# Patient Record
Sex: MALE | Race: WHITE | Employment: FULL TIME | URBAN - METROPOLITAN AREA
[De-identification: names, ages, dates, MRNs, and addresses within clinical notes are randomized per-mention and may not be internally consistent; named-entity substitution may affect disease eponyms.]

---

## 2017-03-18 ENCOUNTER — OFFICE VISIT (OUTPATIENT)
Dept: FAMILY MEDICINE CLINIC | Facility: CLINIC | Age: 52
End: 2017-03-18

## 2017-03-18 VITALS
TEMPERATURE: 99 F | DIASTOLIC BLOOD PRESSURE: 82 MMHG | RESPIRATION RATE: 20 BRPM | SYSTOLIC BLOOD PRESSURE: 110 MMHG | OXYGEN SATURATION: 95 % | HEART RATE: 101 BPM

## 2017-03-18 DIAGNOSIS — J01.00 ACUTE NON-RECURRENT MAXILLARY SINUSITIS: Primary | ICD-10-CM

## 2017-03-18 PROCEDURE — 99213 OFFICE O/P EST LOW 20 MIN: CPT | Performed by: NURSE PRACTITIONER

## 2017-03-18 RX ORDER — AMOXICILLIN AND CLAVULANATE POTASSIUM 875; 125 MG/1; MG/1
1 TABLET, FILM COATED ORAL 2 TIMES DAILY
Qty: 20 TABLET | Refills: 0 | Status: SHIPPED | OUTPATIENT
Start: 2017-03-18 | End: 2017-03-28

## 2017-03-18 RX ORDER — CODEINE PHOSPHATE AND GUAIFENESIN 10; 100 MG/5ML; MG/5ML
10 SOLUTION ORAL NIGHTLY PRN
Qty: 120 ML | Refills: 0 | Status: SHIPPED | OUTPATIENT
Start: 2017-03-18 | End: 2018-05-21

## 2017-03-18 RX ORDER — BENZONATATE 200 MG/1
200 CAPSULE ORAL 3 TIMES DAILY PRN
Qty: 30 CAPSULE | Refills: 0 | Status: SHIPPED | OUTPATIENT
Start: 2017-03-18 | End: 2018-05-21

## 2017-03-18 NOTE — PATIENT INSTRUCTIONS
Use the rescue inhaler at home  OK to use the OTC cold and cough medications being used    Sinusitis (Antibiotic Treatment)    The sinuses are air-filled spaces within the bones of the face.  They connect to the inside of the nose. Sinusitis is an inflammat · Do not use nasal rinses or irrigation during an acute sinus infection, unless told to by your health care provider. Rinsing may spread the infection to other sinuses.   · Use acetaminophen or ibuprofen to control pain, unless another pain medicine was pre Healthy airway: Airways are normally open. Air moves in and out easily. Healthy cilia: Tiny, hairlike cilia sweep mucus and particles up and out of the airways. Lungs with bronchitis  Bronchitis often occurs with a cold or the flu virus.  The airways · Drink a lot of water and juice. They can soothe the throat and may help thin mucus. · Sit up or use extra pillows when in bed to help lessen coughing and congestion.   · Ask your provider about using cough medicine, pain and fever medicine, or a deconges © 0910-9606 [address], [address] [place]. All rights reserved. This information is not intended as a substitute for professional medical care. Always follow your healthcare professional's instructions.

## 2017-03-18 NOTE — PROGRESS NOTES
CHIEF COMPLAINT:   Patient presents with:  Cough        HPI:   Andrés Can is a 50year old male who presents for cough for  2  weeks. Patient has no history of bronchitis. Cough is both dry and productive and is worse at night.  Other triggers for the c HENT: Atraumatic, normocephalic. TM's clear bilaterally. Nostrils patent, nasal mucosa red and inflamed. Yellow discharge noted to left side. No erythema of the throat. NECK: supple, non-tender. LUNGS: Normal respiratory rate. Normal effort.   dry coug The sinuses are air-filled spaces within the bones of the face. They connect to the inside of the nose. Sinusitis is an inflammation of the tissue lining the sinus cavity. Sinus inflammation can occur during a cold.  It can also be due to allergies to polle · Do not use nasal rinses or irrigation during an acute sinus infection, unless told to by your health care provider. Rinsing may spread the infection to other sinuses.   · Use acetaminophen or ibuprofen to control pain, unless another pain medicine was pre Healthy airway: Airways are normally open. Air moves in and out easily. Healthy cilia: Tiny, hairlike cilia sweep mucus and particles up and out of the airways. Lungs with bronchitis  Bronchitis often occurs with a cold or the flu virus.  The airways · Drink a lot of water and juice. They can soothe the throat and may help thin mucus. · Sit up or use extra pillows when in bed to help lessen coughing and congestion.   · Ask your provider about using cough medicine, pain and fever medicine, or a deconges © 2267-1226 [address], [address] [place]. All rights reserved. This information is not intended as a substitute for professional medical care. Always follow your healthcare professional's instructions.               Meche Wright

## 2018-05-22 PROBLEM — M25.562 LEFT KNEE PAIN, UNSPECIFIED CHRONICITY: Status: ACTIVE | Noted: 2018-05-22

## 2018-05-22 PROBLEM — S86.912A KNEE STRAIN, LEFT, INITIAL ENCOUNTER: Status: ACTIVE | Noted: 2018-05-22

## 2020-01-16 ENCOUNTER — OFFICE VISIT (OUTPATIENT)
Dept: SURGERY | Facility: CLINIC | Age: 55
End: 2020-01-16
Payer: COMMERCIAL

## 2020-01-16 VITALS
BODY MASS INDEX: 28.04 KG/M2 | TEMPERATURE: 98 F | SYSTOLIC BLOOD PRESSURE: 119 MMHG | HEIGHT: 72 IN | HEART RATE: 76 BPM | WEIGHT: 207 LBS | DIASTOLIC BLOOD PRESSURE: 84 MMHG

## 2020-01-16 DIAGNOSIS — K62.5 RECTAL BLEEDING: Primary | ICD-10-CM

## 2020-01-16 PROCEDURE — 46600 DIAGNOSTIC ANOSCOPY SPX: CPT | Performed by: SURGERY

## 2020-01-16 PROCEDURE — 99243 OFF/OP CNSLTJ NEW/EST LOW 30: CPT | Performed by: SURGERY

## 2020-01-22 RX ORDER — POLYETHYLENE GLYCOL 3350, SODIUM CHLORIDE, SODIUM BICARBONATE, POTASSIUM CHLORIDE 420; 11.2; 5.72; 1.48 G/4L; G/4L; G/4L; G/4L
POWDER, FOR SOLUTION ORAL
Qty: 1 BOTTLE | Refills: 0 | Status: SHIPPED | OUTPATIENT
Start: 2020-01-22

## 2020-01-22 NOTE — H&P
New Patient Visit Note       Active Problems      1. Rectal bleeding        Chief Complaint   Patient presents with:  Anal / Rectal Problem: NP c/o rectal bleeding and pain, wife is former pt of Dr. Yolanda Boyle. Pt states the bleeding is intermittent.       All arm)   Pulse 76   Temp 97.6 °F (36.4 °C) (Oral)   Ht 72\"   Wt 207 lb (93.9 kg)   BMI 28.07 kg/m²   Physical Exam   Constitutional: He is oriented to person, place, and time. He appears well-developed and well-nourished.    HENT:   Head: Normocephalic and a with screening colonoscopy for single episode of rectal bleeding  No indication for surgical intervention for small internal hemorrhoids noted on examination         The risks, benefits, complications, possible outcomes and alternatives of the procedure we

## 2020-01-22 NOTE — PROCEDURES
Date of procedure:   January 16, 2020    Preop Diagnosis:  Hemorrhoidal disease    Postop diagnosis:   Same    Procedure:  Anoscopy    Surgeon:   Carson Alba    Anesthesia:   None    Findings:   Multiple small internal hemorrhoids noted-no evidence of external

## 2020-02-29 ENCOUNTER — LAB ENCOUNTER (OUTPATIENT)
Dept: LAB | Age: 55
End: 2020-02-29
Attending: NURSE PRACTITIONER
Payer: COMMERCIAL

## 2020-02-29 ENCOUNTER — OFFICE VISIT (OUTPATIENT)
Dept: INTERNAL MEDICINE CLINIC | Facility: CLINIC | Age: 55
End: 2020-02-29
Payer: COMMERCIAL

## 2020-02-29 VITALS
WEIGHT: 210 LBS | TEMPERATURE: 98 F | BODY MASS INDEX: 27.83 KG/M2 | DIASTOLIC BLOOD PRESSURE: 76 MMHG | RESPIRATION RATE: 16 BRPM | HEIGHT: 73 IN | HEART RATE: 68 BPM | OXYGEN SATURATION: 96 % | SYSTOLIC BLOOD PRESSURE: 124 MMHG

## 2020-02-29 DIAGNOSIS — Z12.5 SCREENING FOR MALIGNANT NEOPLASM OF PROSTATE: ICD-10-CM

## 2020-02-29 DIAGNOSIS — Z13.220 SCREENING, LIPID: ICD-10-CM

## 2020-02-29 DIAGNOSIS — Z00.00 ROUTINE GENERAL MEDICAL EXAMINATION AT A HEALTH CARE FACILITY: Primary | ICD-10-CM

## 2020-02-29 DIAGNOSIS — Z13.0 SCREENING FOR BLOOD DISEASE: ICD-10-CM

## 2020-02-29 DIAGNOSIS — Z13.29 SCREENING FOR THYROID DISORDER: ICD-10-CM

## 2020-02-29 DIAGNOSIS — G47.10 HYPERSOMNOLENCE: ICD-10-CM

## 2020-02-29 DIAGNOSIS — R06.81 APNEA: ICD-10-CM

## 2020-02-29 DIAGNOSIS — Z13.1 SCREENING FOR DIABETES MELLITUS: ICD-10-CM

## 2020-02-29 DIAGNOSIS — Z91.09 ENVIRONMENTAL ALLERGIES: ICD-10-CM

## 2020-02-29 DIAGNOSIS — R06.83 SNORING: ICD-10-CM

## 2020-02-29 PROBLEM — S86.912A KNEE STRAIN, LEFT, INITIAL ENCOUNTER: Status: RESOLVED | Noted: 2018-05-22 | Resolved: 2020-02-29

## 2020-02-29 PROBLEM — M25.562 LEFT KNEE PAIN, UNSPECIFIED CHRONICITY: Status: RESOLVED | Noted: 2018-05-22 | Resolved: 2020-02-29

## 2020-02-29 LAB
ALBUMIN SERPL-MCNC: 3.8 G/DL (ref 3.4–5)
ALBUMIN/GLOB SERPL: 1 {RATIO} (ref 1–2)
ALP LIVER SERPL-CCNC: 84 U/L (ref 45–117)
ALT SERPL-CCNC: 24 U/L (ref 16–61)
ANION GAP SERPL CALC-SCNC: 4 MMOL/L (ref 0–18)
AST SERPL-CCNC: 15 U/L (ref 15–37)
BASOPHILS # BLD AUTO: 0.03 X10(3) UL (ref 0–0.2)
BASOPHILS NFR BLD AUTO: 0.5 %
BILIRUB SERPL-MCNC: 0.5 MG/DL (ref 0.1–2)
BUN BLD-MCNC: 19 MG/DL (ref 7–18)
BUN/CREAT SERPL: 15.8 (ref 10–20)
CALCIUM BLD-MCNC: 9 MG/DL (ref 8.5–10.1)
CHLORIDE SERPL-SCNC: 106 MMOL/L (ref 98–112)
CHOLEST SMN-MCNC: 190 MG/DL (ref ?–200)
CO2 SERPL-SCNC: 29 MMOL/L (ref 21–32)
COMPLEXED PSA SERPL-MCNC: 1.5 NG/ML (ref ?–4)
CREAT BLD-MCNC: 1.2 MG/DL (ref 0.7–1.3)
DEPRECATED RDW RBC AUTO: 42.9 FL (ref 35.1–46.3)
EOSINOPHIL # BLD AUTO: 0.07 X10(3) UL (ref 0–0.7)
EOSINOPHIL NFR BLD AUTO: 1.2 %
ERYTHROCYTE [DISTWIDTH] IN BLOOD BY AUTOMATED COUNT: 12.6 % (ref 11–15)
GLOBULIN PLAS-MCNC: 3.8 G/DL (ref 2.8–4.4)
GLUCOSE BLD-MCNC: 93 MG/DL (ref 70–99)
HCT VFR BLD AUTO: 51.4 % (ref 39–53)
HDLC SERPL-MCNC: 47 MG/DL (ref 40–59)
HGB BLD-MCNC: 16.8 G/DL (ref 13–17.5)
IMM GRANULOCYTES # BLD AUTO: 0.01 X10(3) UL (ref 0–1)
IMM GRANULOCYTES NFR BLD: 0.2 %
LDLC SERPL CALC-MCNC: 132 MG/DL (ref ?–100)
LYMPHOCYTES # BLD AUTO: 1.88 X10(3) UL (ref 1–4)
LYMPHOCYTES NFR BLD AUTO: 31.5 %
M PROTEIN MFR SERPL ELPH: 7.6 G/DL (ref 6.4–8.2)
MCH RBC QN AUTO: 30.3 PG (ref 26–34)
MCHC RBC AUTO-ENTMCNC: 32.7 G/DL (ref 31–37)
MCV RBC AUTO: 92.6 FL (ref 80–100)
MONOCYTES # BLD AUTO: 0.61 X10(3) UL (ref 0.1–1)
MONOCYTES NFR BLD AUTO: 10.2 %
NEUTROPHILS # BLD AUTO: 3.36 X10 (3) UL (ref 1.5–7.7)
NEUTROPHILS # BLD AUTO: 3.36 X10(3) UL (ref 1.5–7.7)
NEUTROPHILS NFR BLD AUTO: 56.4 %
NONHDLC SERPL-MCNC: 143 MG/DL (ref ?–130)
OSMOLALITY SERPL CALC.SUM OF ELEC: 290 MOSM/KG (ref 275–295)
PATIENT FASTING Y/N/NP: YES
PATIENT FASTING Y/N/NP: YES
PLATELET # BLD AUTO: 302 10(3)UL (ref 150–450)
POTASSIUM SERPL-SCNC: 4.3 MMOL/L (ref 3.5–5.1)
RBC # BLD AUTO: 5.55 X10(6)UL (ref 4.3–5.7)
SODIUM SERPL-SCNC: 139 MMOL/L (ref 136–145)
TRIGL SERPL-MCNC: 57 MG/DL (ref 30–149)
TSI SER-ACNC: 3.13 MIU/ML (ref 0.36–3.74)
VLDLC SERPL CALC-MCNC: 11 MG/DL (ref 0–30)
WBC # BLD AUTO: 6 X10(3) UL (ref 4–11)

## 2020-02-29 PROCEDURE — 80061 LIPID PANEL: CPT | Performed by: NURSE PRACTITIONER

## 2020-02-29 PROCEDURE — 99213 OFFICE O/P EST LOW 20 MIN: CPT | Performed by: NURSE PRACTITIONER

## 2020-02-29 PROCEDURE — 84153 ASSAY OF PSA TOTAL: CPT | Performed by: NURSE PRACTITIONER

## 2020-02-29 PROCEDURE — 99396 PREV VISIT EST AGE 40-64: CPT | Performed by: NURSE PRACTITIONER

## 2020-02-29 PROCEDURE — 80050 GENERAL HEALTH PANEL: CPT | Performed by: NURSE PRACTITIONER

## 2020-02-29 NOTE — PROGRESS NOTES
Susan Becker is a 50year old male who presents for a complete physical exam.     HPI:   Pt complains of . Multiple allergies. Beer wine and random allergies. Recently with increased congestion and SOB. Unsure trigger.    He is concerned his allerg 2    Drug use: No     Social History:   Exercise:  twice per week. Diet: watches fats closely and watches sugar closely  Smoker:  No    Cessation Advised:    Alcohol Use:  yes      Concerns:  no     Health Maintenance  Immunizations: discussed shingrix. [facility].     Routine general medical examination at a health care facility  (primary encounter diagnosis)  Screening, lipid  Screening for diabetes mellitus  Screening for blood disease  Screening for thyroid disorder  Screening for malignant neoplasm of pro

## 2022-05-17 PROCEDURE — 99285 EMERGENCY DEPT VISIT HI MDM: CPT

## 2022-05-17 PROCEDURE — 96374 THER/PROPH/DIAG INJ IV PUSH: CPT

## 2022-05-17 PROCEDURE — 93010 ELECTROCARDIOGRAM REPORT: CPT

## 2022-05-18 ENCOUNTER — APPOINTMENT (OUTPATIENT)
Dept: CT IMAGING | Facility: HOSPITAL | Age: 57
End: 2022-05-18
Attending: EMERGENCY MEDICINE
Payer: COMMERCIAL

## 2022-05-18 ENCOUNTER — APPOINTMENT (OUTPATIENT)
Dept: MRI IMAGING | Facility: HOSPITAL | Age: 57
End: 2022-05-18
Attending: EMERGENCY MEDICINE
Payer: COMMERCIAL

## 2022-05-18 ENCOUNTER — HOSPITAL ENCOUNTER (EMERGENCY)
Facility: HOSPITAL | Age: 57
Discharge: HOME | End: 2022-05-18
Attending: EMERGENCY MEDICINE
Payer: COMMERCIAL

## 2022-05-18 VITALS
RESPIRATION RATE: 14 BRPM | BODY MASS INDEX: 28.49 KG/M2 | DIASTOLIC BLOOD PRESSURE: 95 MMHG | TEMPERATURE: 98 F | OXYGEN SATURATION: 97 % | HEIGHT: 73 IN | SYSTOLIC BLOOD PRESSURE: 135 MMHG | HEART RATE: 75 BPM | WEIGHT: 215 LBS

## 2022-05-18 DIAGNOSIS — R20.2 NUMBNESS AND TINGLING OF LEFT THUMB: Primary | ICD-10-CM

## 2022-05-18 DIAGNOSIS — R20.0 NUMBNESS AND TINGLING OF LEFT THUMB: Primary | ICD-10-CM

## 2022-05-18 DIAGNOSIS — R20.2 FACIAL TINGLING: ICD-10-CM

## 2022-05-18 LAB
ALBUMIN SERPL-MCNC: 3.6 G/DL (ref 3.4–5)
ALBUMIN/GLOB SERPL: 1 {RATIO} (ref 1–2)
ALP LIVER SERPL-CCNC: 78 U/L
ALT SERPL-CCNC: 21 U/L
ANION GAP SERPL CALC-SCNC: 6 MMOL/L (ref 0–18)
APTT PPP: 26.7 SECONDS (ref 23.3–35.6)
AST SERPL-CCNC: 13 U/L (ref 15–37)
ATRIAL RATE: 56 BPM
BASOPHILS # BLD AUTO: 0.03 X10(3) UL (ref 0–0.2)
BASOPHILS NFR BLD AUTO: 0.4 %
BILIRUB SERPL-MCNC: 0.6 MG/DL (ref 0.1–2)
BUN BLD-MCNC: 18 MG/DL (ref 7–18)
CALCIUM BLD-MCNC: 8.5 MG/DL (ref 8.5–10.1)
CHLORIDE SERPL-SCNC: 108 MMOL/L (ref 98–112)
CO2 SERPL-SCNC: 28 MMOL/L (ref 21–32)
CREAT BLD-MCNC: 1.28 MG/DL
EOSINOPHIL # BLD AUTO: 0.12 X10(3) UL (ref 0–0.7)
EOSINOPHIL NFR BLD AUTO: 1.8 %
ERYTHROCYTE [DISTWIDTH] IN BLOOD BY AUTOMATED COUNT: 12.4 %
GLOBULIN PLAS-MCNC: 3.5 G/DL (ref 2.8–4.4)
GLUCOSE BLD-MCNC: 104 MG/DL (ref 70–99)
HCT VFR BLD AUTO: 45.4 %
HGB BLD-MCNC: 15.1 G/DL
IMM GRANULOCYTES # BLD AUTO: 0.01 X10(3) UL (ref 0–1)
IMM GRANULOCYTES NFR BLD: 0.1 %
INR BLD: 0.96 (ref 0.8–1.2)
LYMPHOCYTES # BLD AUTO: 2.39 X10(3) UL (ref 1–4)
LYMPHOCYTES NFR BLD AUTO: 35.8 %
MCH RBC QN AUTO: 30.8 PG (ref 26–34)
MCHC RBC AUTO-ENTMCNC: 33.3 G/DL (ref 31–37)
MCV RBC AUTO: 92.7 FL
MONOCYTES # BLD AUTO: 0.7 X10(3) UL (ref 0.1–1)
MONOCYTES NFR BLD AUTO: 10.5 %
NEUTROPHILS # BLD AUTO: 3.43 X10 (3) UL (ref 1.5–7.7)
NEUTROPHILS # BLD AUTO: 3.43 X10(3) UL (ref 1.5–7.7)
NEUTROPHILS NFR BLD AUTO: 51.4 %
OSMOLALITY SERPL CALC.SUM OF ELEC: 296 MOSM/KG (ref 275–295)
P AXIS: 20 DEGREES
P-R INTERVAL: 178 MS
PLATELET # BLD AUTO: 294 10(3)UL (ref 150–450)
POTASSIUM SERPL-SCNC: 4 MMOL/L (ref 3.5–5.1)
PROT SERPL-MCNC: 7.1 G/DL (ref 6.4–8.2)
PROTHROMBIN TIME: 12.8 SECONDS (ref 11.6–14.8)
Q-T INTERVAL: 432 MS
QRS DURATION: 108 MS
QTC CALCULATION (BEZET): 416 MS
R AXIS: 13 DEGREES
RBC # BLD AUTO: 4.9 X10(6)UL
SARS-COV-2 RNA RESP QL NAA+PROBE: NOT DETECTED
SODIUM SERPL-SCNC: 142 MMOL/L (ref 136–145)
T AXIS: 30 DEGREES
TROPONIN I HIGH SENSITIVITY: 4 NG/L
VENTRICULAR RATE: 56 BPM
WBC # BLD AUTO: 6.7 X10(3) UL (ref 4–11)

## 2022-05-18 PROCEDURE — A9575 INJ GADOTERATE MEGLUMI 0.1ML: HCPCS | Performed by: EMERGENCY MEDICINE

## 2022-05-18 PROCEDURE — 84484 ASSAY OF TROPONIN QUANT: CPT | Performed by: EMERGENCY MEDICINE

## 2022-05-18 PROCEDURE — 80053 COMPREHEN METABOLIC PANEL: CPT | Performed by: EMERGENCY MEDICINE

## 2022-05-18 PROCEDURE — 85025 COMPLETE CBC W/AUTO DIFF WBC: CPT | Performed by: EMERGENCY MEDICINE

## 2022-05-18 PROCEDURE — 93005 ELECTROCARDIOGRAM TRACING: CPT

## 2022-05-18 PROCEDURE — 85730 THROMBOPLASTIN TIME PARTIAL: CPT | Performed by: EMERGENCY MEDICINE

## 2022-05-18 PROCEDURE — 70549 MR ANGIOGRAPH NECK W/O&W/DYE: CPT | Performed by: EMERGENCY MEDICINE

## 2022-05-18 PROCEDURE — 70450 CT HEAD/BRAIN W/O DYE: CPT | Performed by: EMERGENCY MEDICINE

## 2022-05-18 PROCEDURE — 70546 MR ANGIOGRAPH HEAD W/O&W/DYE: CPT | Performed by: EMERGENCY MEDICINE

## 2022-05-18 PROCEDURE — 70553 MRI BRAIN STEM W/O & W/DYE: CPT | Performed by: EMERGENCY MEDICINE

## 2022-05-18 PROCEDURE — 85610 PROTHROMBIN TIME: CPT | Performed by: EMERGENCY MEDICINE

## 2022-05-18 RX ORDER — LORAZEPAM 2 MG/ML
0.5 INJECTION INTRAMUSCULAR ONCE
Status: COMPLETED | OUTPATIENT
Start: 2022-05-18 | End: 2022-05-18

## 2022-05-18 NOTE — ED INITIAL ASSESSMENT (HPI)
Pt c/o L thumb, \"like it's going to sleep since yesterday, like when I squeeze it, weird sensation goes up to my L elbow. \" Also reports L side of face tingling,, \"behind L eye hurts. \" Reports HA, no n/v. Visual acuity L eye, 20/20, R eye-20/25, pt tolerated well. Pt wears eyeglasses.  He denies CP

## 2022-05-19 ENCOUNTER — PATIENT OUTREACH (OUTPATIENT)
Dept: CASE MANAGEMENT | Age: 57
End: 2022-05-19

## 2022-05-19 NOTE — PROGRESS NOTES
1st attempt ED PCP f/u apt request (dc 05/18)    Dr Kristofer Calvert  [address] [place] [address]  [phone]  LVM to call [phone] to assist w/scheduling apt; will try again

## 2022-06-20 ENCOUNTER — LAB ENCOUNTER (OUTPATIENT)
Dept: LAB | Facility: HOSPITAL | Age: 57
End: 2022-06-20
Attending: NURSE PRACTITIONER
Payer: COMMERCIAL

## 2022-06-20 ENCOUNTER — HOSPITAL ENCOUNTER (OUTPATIENT)
Dept: GENERAL RADIOLOGY | Facility: HOSPITAL | Age: 57
Discharge: HOME | End: 2022-06-20
Attending: NURSE PRACTITIONER
Payer: COMMERCIAL

## 2022-06-20 ENCOUNTER — OFFICE VISIT (OUTPATIENT)
Dept: INTERNAL MEDICINE CLINIC | Facility: CLINIC | Age: 57
End: 2022-06-20
Payer: COMMERCIAL

## 2022-06-20 VITALS
TEMPERATURE: 99 F | DIASTOLIC BLOOD PRESSURE: 70 MMHG | HEART RATE: 82 BPM | SYSTOLIC BLOOD PRESSURE: 116 MMHG | OXYGEN SATURATION: 97 % | WEIGHT: 208 LBS | BODY MASS INDEX: 27.57 KG/M2 | HEIGHT: 73 IN

## 2022-06-20 DIAGNOSIS — R20.2 PARESTHESIAS: Primary | ICD-10-CM

## 2022-06-20 DIAGNOSIS — Z12.11 SCREENING FOR MALIGNANT NEOPLASM OF COLON: ICD-10-CM

## 2022-06-20 DIAGNOSIS — R06.02 SOB (SHORTNESS OF BREATH): ICD-10-CM

## 2022-06-20 DIAGNOSIS — U07.1 COVID: ICD-10-CM

## 2022-06-20 LAB — D DIMER PPP FEU-MCNC: <0.27 UG/ML FEU (ref ?–0.55)

## 2022-06-20 PROCEDURE — 3008F BODY MASS INDEX DOCD: CPT | Performed by: NURSE PRACTITIONER

## 2022-06-20 PROCEDURE — 3078F DIAST BP <80 MM HG: CPT | Performed by: NURSE PRACTITIONER

## 2022-06-20 PROCEDURE — 85379 FIBRIN DEGRADATION QUANT: CPT

## 2022-06-20 PROCEDURE — 3074F SYST BP LT 130 MM HG: CPT | Performed by: NURSE PRACTITIONER

## 2022-06-20 PROCEDURE — 99214 OFFICE O/P EST MOD 30 MIN: CPT | Performed by: NURSE PRACTITIONER

## 2022-06-20 PROCEDURE — 36415 COLL VENOUS BLD VENIPUNCTURE: CPT

## 2022-06-20 PROCEDURE — 71046 X-RAY EXAM CHEST 2 VIEWS: CPT | Performed by: NURSE PRACTITIONER

## 2022-06-20 RX ORDER — ALBUTEROL SULFATE 90 UG/1
2 AEROSOL, METERED RESPIRATORY (INHALATION) EVERY 6 HOURS PRN
Qty: 1 EACH | Refills: 0 | Status: SHIPPED | OUTPATIENT
Start: 2022-06-20

## 2023-07-06 PROBLEM — Z12.11 SPECIAL SCREENING FOR MALIGNANT NEOPLASM OF COLON: Status: ACTIVE | Noted: 2023-07-06

## 2024-06-02 ENCOUNTER — HOSPITAL ENCOUNTER (EMERGENCY)
Age: 59
Discharge: HOME | End: 2024-06-02
Payer: COMMERCIAL

## 2024-06-02 ENCOUNTER — APPOINTMENT (OUTPATIENT)
Dept: GENERAL RADIOLOGY | Age: 59
End: 2024-06-02
Attending: NURSE PRACTITIONER
Payer: COMMERCIAL

## 2024-06-02 VITALS
OXYGEN SATURATION: 97 % | DIASTOLIC BLOOD PRESSURE: 92 MMHG | WEIGHT: 215 LBS | TEMPERATURE: 99 F | HEART RATE: 68 BPM | SYSTOLIC BLOOD PRESSURE: 147 MMHG | RESPIRATION RATE: 16 BRPM | HEIGHT: 72 IN | BODY MASS INDEX: 29.12 KG/M2

## 2024-06-02 DIAGNOSIS — R05.1 ACUTE COUGH: ICD-10-CM

## 2024-06-02 DIAGNOSIS — S39.012A STRAIN OF LUMBAR REGION, INITIAL ENCOUNTER: Primary | ICD-10-CM

## 2024-06-02 LAB
BILIRUB UR QL STRIP.AUTO: NEGATIVE
CLARITY UR REFRACT.AUTO: CLEAR
COLOR UR AUTO: YELLOW
GLUCOSE UR STRIP.AUTO-MCNC: NEGATIVE MG/DL
KETONES UR STRIP.AUTO-MCNC: NEGATIVE MG/DL
LEUKOCYTE ESTERASE UR QL STRIP.AUTO: NEGATIVE
NITRITE UR QL STRIP.AUTO: NEGATIVE
PH UR STRIP.AUTO: 5 [PH] (ref 5–8)
PROT UR STRIP.AUTO-MCNC: NEGATIVE MG/DL
RBC UR QL AUTO: NEGATIVE
SP GR UR STRIP.AUTO: 1.02 (ref 1–1.03)
UROBILINOGEN UR STRIP.AUTO-MCNC: 0.2 MG/DL

## 2024-06-02 PROCEDURE — 96372 THER/PROPH/DIAG INJ SC/IM: CPT

## 2024-06-02 PROCEDURE — 99284 EMERGENCY DEPT VISIT MOD MDM: CPT

## 2024-06-02 PROCEDURE — 72100 X-RAY EXAM L-S SPINE 2/3 VWS: CPT | Performed by: NURSE PRACTITIONER

## 2024-06-02 PROCEDURE — 99283 EMERGENCY DEPT VISIT LOW MDM: CPT

## 2024-06-02 PROCEDURE — 81003 URINALYSIS AUTO W/O SCOPE: CPT | Performed by: NURSE PRACTITIONER

## 2024-06-02 RX ORDER — CYCLOBENZAPRINE HCL 10 MG
10 TABLET ORAL 3 TIMES DAILY PRN
Qty: 20 TABLET | Refills: 0 | Status: SHIPPED | OUTPATIENT
Start: 2024-06-02 | End: 2024-06-09

## 2024-06-02 RX ORDER — KETOROLAC TROMETHAMINE 15 MG/ML
15 INJECTION, SOLUTION INTRAMUSCULAR; INTRAVENOUS ONCE
Status: COMPLETED | OUTPATIENT
Start: 2024-06-02 | End: 2024-06-02

## 2024-06-02 RX ORDER — METHYLPREDNISOLONE 4 MG/1
TABLET ORAL
Qty: 1 EACH | Refills: 0 | Status: SHIPPED | OUTPATIENT
Start: 2024-06-02

## 2024-06-02 NOTE — DISCHARGE INSTRUCTIONS
Increase water intake 2-3 liters daily   Do not drive while taking muscle relaxer as it may cause drowsiness.

## 2024-06-02 NOTE — ED PROVIDER NOTES
Patient Seen in: [facility] [place]      History     Chief Complaint   Patient presents with    Back Pain     Stated Complaint: back pain x 1 week    Subjective:   HPI    59-year-old male presents today with complaints of lower back pain that started last week after he was moving furniture in the garage.  Patient states he has been applying ice and heat to it with little relief.  Patient states he has been taking Advil with little relief.  Patient states he has developed a cough which is making the back pain worse.  Patient denies any loss of bowel or bladder function.    Objective:   Past Medical History:    Change in hair    Chest pain on exertion    Dizziness    Esophageal reflux    Fatigue    Heartburn    Hemorrhoids    Indigestion    Nausea    Shortness of breath    Sleep apnea    Sleep disturbance    Wears glasses    Weight gain    Wheezing              Past Surgical History:   Procedure Laterality Date    Colonoscopy      Vasectomy                  Social History     Socioeconomic History    Marital status:    Tobacco Use    Smoking status: Never    Smokeless tobacco: Never   Vaping Use    Vaping status: Never Used   Substance and Sexual Activity    Alcohol use: Not Currently    Drug use: No              Review of Systems   Constitutional: Negative.    HENT: Negative.     Eyes: Negative.    Respiratory:  Positive for cough.    Cardiovascular: Negative.    Gastrointestinal: Negative.    Endocrine: Negative.    Genitourinary: Negative.    Musculoskeletal:  Positive for back pain.   Skin: Negative.    Allergic/Immunologic: Negative.    Neurological: Negative.    Hematological: Negative.    Psychiatric/Behavioral: Negative.         Positive for stated complaint: back pain x 1 week  Other systems are as noted in HPI.  Constitutional and vital signs reviewed.      All other systems reviewed and negative except as noted above.    Physical Exam     ED Triage Vitals [06/02/24 1530]   BP  (!) 147/92   Pulse 68   Resp 16   Temp 99.1 °F (37.3 °C)   Temp src Temporal   SpO2 97 %   O2 Device None (Room air)       Current Vitals:   Vital Signs  BP: (!) 147/92  Pulse: 68  Resp: 16  Temp: 99.1 °F (37.3 °C)  Temp src: Temporal    Oxygen Therapy  SpO2: 97 %  O2 Device: None (Room air)            Physical Exam  Vitals and nursing note reviewed.   Constitutional:       Appearance: Normal appearance. He is normal weight.   HENT:      Head: Normocephalic.      Right Ear: External ear normal.      Left Ear: External ear normal.      Nose: Nose normal.   Eyes:      Extraocular Movements: Extraocular movements intact.      Conjunctiva/sclera: Conjunctivae normal.      Pupils: Pupils are equal, round, and reactive to light.   Cardiovascular:      Rate and Rhythm: Normal rate and regular rhythm.      Pulses: Normal pulses.      Heart sounds: Normal heart sounds.   Pulmonary:      Effort: Pulmonary effort is normal.      Breath sounds: Normal breath sounds.   Abdominal:      General: Abdomen is flat.      Palpations: Abdomen is soft.      Tenderness: There is abdominal tenderness.      Comments: Mild tenderness to the right upper quadrant.   Musculoskeletal:         General: Tenderness present.      Cervical back: Normal range of motion and neck supple.      Comments: Tenderness to palpation over the bilateral lumbar paraspinal region.  Pain elicited with push and pull.  Positive right straight leg raise.   Skin:     Capillary Refill: Capillary refill takes less than 2 seconds.   Neurological:      General: No focal deficit present.      Mental Status: He is alert.      Comments: No acute neurological deficit appreciated.   Psychiatric:         Mood and Affect: Mood normal.             ED Course     Labs Reviewed   URINALYSIS, ROUTINE                      MDM      59-year-old male presents today with complaints of lower back pain that started last week after he was moving furniture in the garage.  Patient states he has  been applying ice and heat to it with little relief.  Patient states he has been taking Advil with little relief.  Patient states he has developed a cough which is making the back pain worse.  Patient denies any loss of bowel or bladder function.  Vital signs: Please see EMR.  Physical exam: Please see exam.  Differential diagnosis: Lumbar radiculopathy, lumbar strain, sciatica, cough.  Recent Results (from the past 24 hour(s))   Urinalysis, Routine    Collection Time: 06/02/24  4:10 PM   Result Value Ref Range    Urine Color Yellow Yellow    Clarity Urine Clear Clear    Spec Gravity 1.020 1.005 - 1.030    Glucose Urine Negative Negative mg/dL    Bilirubin Urine Negative Negative    Ketones Urine Negative Negative mg/dL    Blood Urine Negative Negative    pH Urine 5.0 5.0 - 8.0    Protein Urine Negative Negative mg/dL    Urobilinogen Urine 0.2 <2.0 mg/dL    Nitrite Urine Negative Negative    Leukocyte Esterase Urine Negative Negative    Microscopic Microscopic not indicated      XR LUMBAR SPINE (MIN 2 VIEWS) (CPT=72100)    Result Date: 6/2/2024  CONCLUSION:  1. Straightening of the lumbar lordosis may be positional or due to muscle strain. 2. Mild degenerative change. 3. An MRI of the lumbar spine can be performed for further evaluation of disc pathology as clinically indicated.   LOCATION:  [facility]   Dictated by (CST): Larissa Fernandez MD on 6/02/2024 at 4:34 PM     Finalized by (CST): Larissa Fernandez MD on 6/02/2024 at 4:35 PM      Based on physical exam and HPI we will diagnose with lumbar strain and cough.  Will prescribe a Medrol Dosepak and muscle relaxer.  Patient is to follow-up with primary care provider within 1 week.  If symptoms do not improve patient is to follow-up with orthopedic.  Note to Patient  The 21st Century Cures Act makes medical notes like these available to patients in the interest of transparency. However, be advised this is a medical document and is intended as oqpe-sw-dupx communication; it is  written in medical language and may appear blunt, direct, or contain abbreviations or verbiage that are unfamiliar. Medical documents are intended to carry relevant information, facts as evident, and the clinical opinion of the practitioner.     This report has been produced using speech recognition software, and may contain errors related to grammar, punctuation, spelling, words or phrases unrecognized or not translated appropriately to text; these errors may be referred to the dictating provider for further clarification and/or addendum as needed.                                     Medical Decision Making  59-year-old male presents today with complaints of lower back pain that started last week after he was moving furniture in the garage.  Patient states he has been applying ice and heat to it with little relief.  Patient states he has been taking Advil with little relief.  Patient states he has developed a cough which is making the back pain worse.  Patient denies any loss of bowel or bladder function.    Problems Addressed:  Acute cough: acute illness or injury  Strain of lumbar region, initial encounter: acute illness or injury    Amount and/or Complexity of Data Reviewed  Labs: ordered. Decision-making details documented in ED Course.  Radiology: ordered. Decision-making details documented in ED Course.  ECG/medicine tests: ordered. Decision-making details documented in ED Course.    Risk  Prescription drug management.        Disposition and Plan     Clinical Impression:  1. Strain of lumbar region, initial encounter    2. Acute cough         Disposition:  Discharge  6/2/2024  4:43 pm    Follow-up:  Schriedel, Adam, MD  [address]  [place] [address]  [phone]    Follow up in 1 week(s)      Aramis Nicole MD  [address] [place] [id number] [phone]    Follow up in 1 week(s)  If symptoms worsen          Medications Prescribed:  Current Discharge Medication List         START taking these medications    Details   methylPREDNISolone (MEDROL) 4 MG Oral Tablet Therapy Pack Dosepack: take as directed  Qty: 1 each, Refills: 0      cyclobenzaprine 10 MG Oral Tab Take 1 tablet (10 mg total) by mouth 3 (three) times daily as needed.  Qty: 20 tablet, Refills: 0

## 2024-06-24 NOTE — PROGRESS NOTES
Renzo Silverio is a 59 year old male.    Chief Complaint   Patient presents with    Follow - Up     ES rm - 11 - f/u lower back        HPI:   here for follow up   last CPE 2020  last ov 2022.  Last labs 2020/2022.  Last PSA 2020  he will schedule.      Presents for follow up of back pain.  He was moving things in the garage with sudden onset of pain.  Seen in ER 6/2/24 for low back pain.  Treated with medrol dose pack and flexeril.  Xray reviewed.  He is doing well overall.      The ER provider commented on his hernia which he was not sure had. Did note bulging but it has been there for some time.     New onset of RLQ fullness and tenderness.  Uncomfortable.  Regular BM  daily.  No nausea or vomiting.      Snoring and hypersomnia.  Wife complaining about his snoring  interested in home sleep study  had in lab study many years ago with difficulty sleeping.      Dyslipidemia.  LDL in 2020 132   father with heart disease.      Patient Active Problem List   Diagnosis   (none) - all problems resolved or deleted     Current Outpatient Medications   Medication Sig Dispense Refill    albuterol 108 (90 Base) MCG/ACT Inhalation Aero Soln Inhale 2 puffs into the lungs every 6 (six) hours as needed for Wheezing. 1 each 0    Famotidine (PEPCID OR) Take by mouth daily as needed.      Ibuprofen (MOTRIN OR) daily as needed.      methylPREDNISolone (MEDROL) 4 MG Oral Tablet Therapy Pack Dosepack: take as directed (Patient not taking: Reported on 6/25/2024) 1 each 0    PEG 3350-KCl-Na Bicarb-NaCl 420 g Oral Recon Soln Take as directed by physician (Patient not taking: Reported on 6/25/2024) 4000 mL 0      Past Medical History:    Change in hair    Chest pain on exertion    Dizziness    Esophageal reflux    Fatigue    Heartburn    Hemorrhoids    Indigestion    Nausea    Shortness of breath    Sleep apnea    Sleep disturbance    Wears glasses    Weight gain    Wheezing      Social History:  Social History     Socioeconomic History     Marital status:    Tobacco Use    Smoking status: Never    Smokeless tobacco: Never   Vaping Use    Vaping status: Never Used   Substance and Sexual Activity    Alcohol use: Not Currently    Drug use: No     Family History   Problem Relation Age of Onset    Heart Disorder Father 65        quadruple bypass    Asthma Mother     Other (Other) Mother         gallbladder     Mental Disorder Maternal Grandmother         dementia    Dementia Maternal Grandmother     Cancer Other 38        colon cancer        Allergies  Allergies   Allergen Reactions    Beer WHEEZING    Dander WHEEZING     Cats and dogs    Nickel RASH       REVIEW OF SYSTEMS:   GENERAL HEALTH: feels RESPIRATORY: denies shortness of breath with exertion, no cough  CARDIOVASCULAR: denies chest pain on exertion, no palpatations  GI: as above   MUSCULOSKELETAL:  back improved.   NEURO: denies headaches,     EXAM:   /84 (BP Location: Left arm, Patient Position: Sitting, Cuff Size: large)   Pulse 76   Temp 97.2 °F (36.2 °C) (Temporal)   Resp 16   Ht 6' 0.05\" (1.83 m)   Wt 211 lb 12.8 oz (96.1 kg)   SpO2 97%   BMI 28.69 kg/m²   GENERAL: well developed, well nourished,in no apparent distress  LUNGS: normal rate without respiratory distress, lungs clear to auscultation  CARDIO: RRR without murmur  GI: normal bowel sounds, generalized mild discomfort.  No rebound or guarding. Abdominal hernia extending into umbilical area.  Soft.  Reducible.   EXTREMITIES: no edema, normal strength and tone  PSYCH: alert and oriented x 3    ASSESSMENT AND PLAN:     Encounter Diagnoses   Name Primary?    Acute low back pain, unspecified back pain laterality, unspecified whether sciatica present  resolved.  Yes    Snoring  sleep study ordered.       Hypersomnia     Hernia with abd fullness.  Refer to general surgeyr.  Check labs.  If not improved with f/u visit may require imaging.       Dyslipidemia  check labs.      Screening for prostate cancer     Screening  for blood disease     Screening for diabetes mellitus     Screening, lipid     Screening for thyroid disorder    He will schedule CPE on line.     Orders Placed This Encounter   Procedures    CBC With Differential With Platelet    Comp Metabolic Panel    Lipid Panel    TSH W Reflex To Free T4    PSA Total, Screen       Meds & Refills for this Visit:  Requested Prescriptions      No prescriptions requested or ordered in this encounter       Imaging & Consults:  OP REFERRAL HOME SLEEP APNEA TEST [facility] - INTERNAL    No follow-ups on file.  There are no Patient Instructions on file for this visit.

## 2024-06-25 ENCOUNTER — OFFICE VISIT (OUTPATIENT)
Dept: INTERNAL MEDICINE CLINIC | Facility: CLINIC | Age: 59
End: 2024-06-25

## 2024-06-25 VITALS
OXYGEN SATURATION: 97 % | SYSTOLIC BLOOD PRESSURE: 118 MMHG | DIASTOLIC BLOOD PRESSURE: 84 MMHG | WEIGHT: 211.81 LBS | HEART RATE: 76 BPM | RESPIRATION RATE: 16 BRPM | HEIGHT: 72.05 IN | BODY MASS INDEX: 28.69 KG/M2 | TEMPERATURE: 97 F

## 2024-06-25 DIAGNOSIS — R06.83 SNORING: ICD-10-CM

## 2024-06-25 DIAGNOSIS — Z13.220 SCREENING, LIPID: ICD-10-CM

## 2024-06-25 DIAGNOSIS — Z13.0 SCREENING FOR BLOOD DISEASE: ICD-10-CM

## 2024-06-25 DIAGNOSIS — Z12.5 SCREENING FOR PROSTATE CANCER: ICD-10-CM

## 2024-06-25 DIAGNOSIS — K46.9 HERNIA: ICD-10-CM

## 2024-06-25 DIAGNOSIS — G47.10 HYPERSOMNIA: ICD-10-CM

## 2024-06-25 DIAGNOSIS — E78.5 DYSLIPIDEMIA: ICD-10-CM

## 2024-06-25 DIAGNOSIS — Z13.29 SCREENING FOR THYROID DISORDER: ICD-10-CM

## 2024-06-25 DIAGNOSIS — M54.50 ACUTE LOW BACK PAIN, UNSPECIFIED BACK PAIN LATERALITY, UNSPECIFIED WHETHER SCIATICA PRESENT: Primary | ICD-10-CM

## 2024-06-25 DIAGNOSIS — Z13.1 SCREENING FOR DIABETES MELLITUS: ICD-10-CM

## 2024-06-25 PROBLEM — Z12.11 SPECIAL SCREENING FOR MALIGNANT NEOPLASM OF COLON: Status: RESOLVED | Noted: 2023-07-06 | Resolved: 2024-06-25

## 2024-06-25 PROCEDURE — 3008F BODY MASS INDEX DOCD: CPT | Performed by: NURSE PRACTITIONER

## 2024-06-25 PROCEDURE — G2211 COMPLEX E/M VISIT ADD ON: HCPCS | Performed by: NURSE PRACTITIONER

## 2024-06-25 PROCEDURE — 99214 OFFICE O/P EST MOD 30 MIN: CPT | Performed by: NURSE PRACTITIONER

## 2024-06-25 PROCEDURE — 3074F SYST BP LT 130 MM HG: CPT | Performed by: NURSE PRACTITIONER

## 2024-06-25 PROCEDURE — 3079F DIAST BP 80-89 MM HG: CPT | Performed by: NURSE PRACTITIONER

## 2024-07-15 ENCOUNTER — LAB ENCOUNTER (OUTPATIENT)
Dept: LAB | Age: 59
End: 2024-07-15
Attending: NURSE PRACTITIONER
Payer: COMMERCIAL

## 2024-07-15 DIAGNOSIS — Z13.29 SCREENING FOR THYROID DISORDER: ICD-10-CM

## 2024-07-15 DIAGNOSIS — Z13.0 SCREENING FOR BLOOD DISEASE: ICD-10-CM

## 2024-07-15 DIAGNOSIS — Z13.1 SCREENING FOR DIABETES MELLITUS: ICD-10-CM

## 2024-07-15 DIAGNOSIS — Z13.220 SCREENING, LIPID: ICD-10-CM

## 2024-07-15 DIAGNOSIS — Z12.5 SCREENING FOR PROSTATE CANCER: ICD-10-CM

## 2024-07-15 LAB
ALBUMIN SERPL-MCNC: 3.8 G/DL (ref 3.4–5)
ALBUMIN/GLOB SERPL: 1.1 {RATIO} (ref 1–2)
ALP LIVER SERPL-CCNC: 90 U/L
ALT SERPL-CCNC: 30 U/L
ANION GAP SERPL CALC-SCNC: 8 MMOL/L (ref 0–18)
AST SERPL-CCNC: 11 U/L (ref 15–37)
BASOPHILS # BLD AUTO: 0.02 X10(3) UL (ref 0–0.2)
BASOPHILS NFR BLD AUTO: 0.3 %
BILIRUB SERPL-MCNC: 0.4 MG/DL (ref 0.1–2)
BUN BLD-MCNC: 20 MG/DL (ref 9–23)
CALCIUM BLD-MCNC: 8.8 MG/DL (ref 8.5–10.1)
CHLORIDE SERPL-SCNC: 109 MMOL/L (ref 98–112)
CHOLEST SERPL-MCNC: 187 MG/DL (ref ?–200)
CO2 SERPL-SCNC: 25 MMOL/L (ref 21–32)
COMPLEXED PSA SERPL-MCNC: 2.06 NG/ML (ref ?–4)
CREAT BLD-MCNC: 1.36 MG/DL
EGFRCR SERPLBLD CKD-EPI 2021: 61 ML/MIN/1.73M2 (ref 60–?)
EOSINOPHIL # BLD AUTO: 0.08 X10(3) UL (ref 0–0.7)
EOSINOPHIL NFR BLD AUTO: 1.4 %
ERYTHROCYTE [DISTWIDTH] IN BLOOD BY AUTOMATED COUNT: 13.2 %
FASTING PATIENT LIPID ANSWER: YES
FASTING STATUS PATIENT QL REPORTED: YES
GLOBULIN PLAS-MCNC: 3.5 G/DL (ref 2.8–4.4)
GLUCOSE BLD-MCNC: 99 MG/DL (ref 70–99)
HCT VFR BLD AUTO: 47.8 %
HDLC SERPL-MCNC: 44 MG/DL (ref 40–59)
HGB BLD-MCNC: 16.1 G/DL
IMM GRANULOCYTES # BLD AUTO: 0.02 X10(3) UL (ref 0–1)
IMM GRANULOCYTES NFR BLD: 0.3 %
LDLC SERPL CALC-MCNC: 123 MG/DL (ref ?–100)
LYMPHOCYTES # BLD AUTO: 1.61 X10(3) UL (ref 1–4)
LYMPHOCYTES NFR BLD AUTO: 27.7 %
MCH RBC QN AUTO: 30.4 PG (ref 26–34)
MCHC RBC AUTO-ENTMCNC: 33.7 G/DL (ref 31–37)
MCV RBC AUTO: 90.4 FL
MONOCYTES # BLD AUTO: 0.52 X10(3) UL (ref 0.1–1)
MONOCYTES NFR BLD AUTO: 8.9 %
NEUTROPHILS # BLD AUTO: 3.57 X10 (3) UL (ref 1.5–7.7)
NEUTROPHILS # BLD AUTO: 3.57 X10(3) UL (ref 1.5–7.7)
NEUTROPHILS NFR BLD AUTO: 61.4 %
NONHDLC SERPL-MCNC: 143 MG/DL (ref ?–130)
OSMOLALITY SERPL CALC.SUM OF ELEC: 297 MOSM/KG (ref 275–295)
PLATELET # BLD AUTO: 308 10(3)UL (ref 150–450)
POTASSIUM SERPL-SCNC: 4.1 MMOL/L (ref 3.5–5.1)
PROT SERPL-MCNC: 7.3 G/DL (ref 6.4–8.2)
RBC # BLD AUTO: 5.29 X10(6)UL
SODIUM SERPL-SCNC: 142 MMOL/L (ref 136–145)
T4 FREE SERPL-MCNC: 0.9 NG/DL (ref 0.8–1.7)
TRIGL SERPL-MCNC: 110 MG/DL (ref 30–149)
TSI SER-ACNC: 4.2 MIU/ML (ref 0.36–3.74)
VLDLC SERPL CALC-MCNC: 19 MG/DL (ref 0–30)
WBC # BLD AUTO: 5.8 X10(3) UL (ref 4–11)

## 2024-07-15 PROCEDURE — 84439 ASSAY OF FREE THYROXINE: CPT | Performed by: NURSE PRACTITIONER

## 2024-07-15 PROCEDURE — 84153 ASSAY OF PSA TOTAL: CPT | Performed by: NURSE PRACTITIONER

## 2024-07-15 PROCEDURE — 80050 GENERAL HEALTH PANEL: CPT | Performed by: NURSE PRACTITIONER

## 2024-07-15 PROCEDURE — 80061 LIPID PANEL: CPT | Performed by: NURSE PRACTITIONER

## 2024-08-08 ENCOUNTER — OFFICE VISIT (OUTPATIENT)
Dept: SLEEP CENTER | Age: 59
End: 2024-08-08
Attending: INTERNAL MEDICINE
Payer: COMMERCIAL

## 2024-08-08 DIAGNOSIS — R06.83 SNORING: ICD-10-CM

## 2024-08-08 DIAGNOSIS — G47.10 HYPERSOMNIA: ICD-10-CM

## 2024-08-08 PROCEDURE — 95806 SLEEP STUDY UNATT&RESP EFFT: CPT

## 2024-08-12 ENCOUNTER — SLEEP STUDY (OUTPATIENT)
Facility: CLINIC | Age: 59
End: 2024-08-12
Payer: COMMERCIAL

## 2024-08-12 ENCOUNTER — MED REC SCAN ONLY (OUTPATIENT)
Dept: INTERNAL MEDICINE CLINIC | Facility: CLINIC | Age: 59
End: 2024-08-12

## 2024-08-12 DIAGNOSIS — G47.30 SLEEP APNEA, UNSPECIFIED TYPE: Primary | ICD-10-CM

## 2024-08-12 PROBLEM — E78.5 DYSLIPIDEMIA: Status: ACTIVE | Noted: 2024-08-12

## 2024-08-12 PROBLEM — G47.33 OSA (OBSTRUCTIVE SLEEP APNEA): Status: ACTIVE | Noted: 2024-08-12

## 2024-08-12 PROBLEM — N28.9 RENAL INSUFFICIENCY: Status: ACTIVE | Noted: 2024-08-12

## 2024-08-12 PROCEDURE — 95806 SLEEP STUDY UNATT&RESP EFFT: CPT | Performed by: INTERNAL MEDICINE

## 2024-08-12 NOTE — PROCEDURES
[facility]       Accredited by the American Academy of Sleep Medicine (AASM)    PATIENT'S NAME:        CELSO CRAIG  ATTENDING PHYSICIAN:   Jayce Ritter MD  REFERRING PHYSICIAN:   TORSTEN Cherry  PATIENT ACCOUNT #:     [id number]        LOCATION:       [facility]  MEDICAL RECORD #:      [id number]        YOB: 1965  DATE OF STUDY:         08/08/2024    SLEEP STUDY REPORT    STUDY TYPE:  Unattended sleep study.    PATIENT CHARACTERISTICS:  Age 59 years.  Gender male.    HISTORY:  The patient is a 59-year-old female with history of GERD, hemorrhoids, acute back ache, and hyposomnia with snoring. This home sleep study is performed for evaluation of obstructive sleep apnea syndrome.    UNATTENDED SLEEP STUDY RECORDING PARAMETERS:  The patient underwent a formal technically adequate unattended diagnostic sleep study coordinated with the [facility].  The study was performed in accordance with the AASM standard for Out of Center Sleep Testing.  The four-channel Type III HST measures the following parameters:  flow, respiratory effort, pulse, and oxygen saturation.    SCORING:  This study was scored in accordance with AASM scoring rules and Medicare rule 1B.    FINDINGS:  The flow evaluation time began at 9:43 p.m. and ended at 5:41 a.m. with a duration of 7 hours and 56 minutes.  Overall apnea-hypopnea index was 41.9 events per hour.  Supine AHI was 42 events per hour.  SpO2 jon was 81% and oxygen saturation was less than 88% for 17 minutes.  Mild snoring was noted during sleep study recording.  Average heart rate was 65 beats per minute, and maximum heart rate 93 beats per minute.    IMPRESSION:  Overall apnea-hypopnea index of 41.9 events per hour is consistent with severe obstructive sleep apnea syndrome.  SpO2 jon was 81% and oxygen saturation was less than 88% for 17 minutes.    DIAGNOSIS:  Obstructive sleep apnea syndrome  (G47.33).    PLAN:    1.   At the request of referring physician, we will confer with the patient regarding the results of the sleep study and make treatment recommendations.  2.   The patient is a candidate for treatment with positive airway pressure (PAP) advised as first-line therapy.  Alternatives include oral appliance therapy and evaluation of upper airway by ear, nose, and throat specialist.  3.   Continuous positive airway pressure (CPAP) titration sleep study should be completed.  4.   Advise the patient to avoid alcoholic beverages and medications that are respiratory depressants and, if drowsy, use caution when driving or operating machinery.    Dictated By Jayce Ritter MD  d: 08/11/2024 22:36:26  t: 08/12/2024 02:20:16  Job [id number]  [facility]/    cc: TORSTEN Cherry

## 2024-08-20 ENCOUNTER — OFFICE VISIT (OUTPATIENT)
Facility: CLINIC | Age: 59
End: 2024-08-20
Payer: COMMERCIAL

## 2024-08-20 VITALS
BODY MASS INDEX: 29.12 KG/M2 | OXYGEN SATURATION: 97 % | RESPIRATION RATE: 16 BRPM | WEIGHT: 215 LBS | HEIGHT: 72 IN | DIASTOLIC BLOOD PRESSURE: 72 MMHG | HEART RATE: 69 BPM | TEMPERATURE: 99 F | SYSTOLIC BLOOD PRESSURE: 130 MMHG

## 2024-08-20 DIAGNOSIS — J30.9 ALLERGIC RHINITIS, UNSPECIFIED SEASONALITY, UNSPECIFIED TRIGGER: ICD-10-CM

## 2024-08-20 DIAGNOSIS — K21.9 GASTROESOPHAGEAL REFLUX DISEASE, UNSPECIFIED WHETHER ESOPHAGITIS PRESENT: ICD-10-CM

## 2024-08-20 DIAGNOSIS — G47.33 OSA (OBSTRUCTIVE SLEEP APNEA): Primary | ICD-10-CM

## 2024-08-20 DIAGNOSIS — G47.19 DAYTIME HYPERSOMNOLENCE: ICD-10-CM

## 2024-08-20 PROCEDURE — 3008F BODY MASS INDEX DOCD: CPT | Performed by: INTERNAL MEDICINE

## 2024-08-20 PROCEDURE — 99244 OFF/OP CNSLTJ NEW/EST MOD 40: CPT | Performed by: INTERNAL MEDICINE

## 2024-08-20 PROCEDURE — 3078F DIAST BP <80 MM HG: CPT | Performed by: INTERNAL MEDICINE

## 2024-08-20 PROCEDURE — 3075F SYST BP GE 130 - 139MM HG: CPT | Performed by: INTERNAL MEDICINE

## 2024-08-20 NOTE — PROGRESS NOTES
Pulmonary/Critical Care/Sleep Medicine    Consult Note     PCP: Adam Schriedel, MD   Phone: [phone]   Fax: [phone]          Chief Complaint   Patient presents with    Obstructive Sleep Apnea (FRANDY)     Sleep consult HST 8/11       HPI  I had the pleasure of seeing Renzo Silverio who is a pleasant 59 year old male who presents for evaluation of FRANDY    The patient states that he has snored at home in past and was noted to have witnessed apnea, a sleep study performed shows FRANDY,  so he presents for sleep evaluation.      He states bed time around 10 PM . It takes few-10 min to fall asleep and leaves bed around 56 AM. He wakes up sometimes 2 times a night.  He is sleepy and fatigued during the daytime.  He admits to tossing and turning at night.  He denies nightmares, sleep talking or sleep walking.  He bdenies AM headaches.  He denies symptoms sleep attacks     The patient denies kicking legs at night. Denies  teeth grinding.       He drinks 3 caffeine cans of soda daily.       He has no pets. Allergic to dogs and cats          Hx of tobacco use: He  reports that he has never smoked. He has never been exposed to tobacco smoke. He has never used smokeless tobacco.    Past Medical History:    Allergic rhinitis    Asthma (HCC)    Change in hair    Chest pain on exertion    Dizziness    Esophageal reflux    Fatigue    Heartburn    Hemorrhoids    Indigestion    Nausea    Shortness of breath    Sleep apnea    Sleep disturbance    Wears glasses    Weight gain    Wheezing      Past Surgical History:   Procedure Laterality Date    Colonoscopy      Vasectomy       Allergies   Allergen Reactions    Beer WHEEZING    Dander WHEEZING     Cats and dogs    Nickel RASH     Current Outpatient Medications   Medication Sig Dispense Refill    albuterol 108 (90 Base) MCG/ACT Inhalation Aero Soln Inhale 2 puffs into the lungs every 6 (six) hours as needed for Wheezing. 1 each 0    Famotidine (PEPCID OR) Take by mouth daily as  needed.      Ibuprofen (MOTRIN OR) daily as needed.        Social History     Socioeconomic History    Marital status:    Occupational History    Occupation:      Comment: [employer]   Tobacco Use    Smoking status: Never     Passive exposure: Never    Smokeless tobacco: Never   Vaping Use    Vaping status: Never Used   Substance and Sexual Activity    Alcohol use: Not Currently    Drug use: Never      Immunization History   Administered Date(s) Administered    Covid-19 Vaccine Moderna 100 mcg/0.5 ml 04/08/2021, 05/13/2021    Covid-19 Vaccine Pfizer 30 mcg/0.3 ml 12/27/2021      Family History   Problem Relation Age of Onset    Heart Disorder Father 65        quadruple bypass    Asthma Mother     Other (Other) Mother         gallbladder     Mental Disorder Maternal Grandmother         dementia    Dementia Maternal Grandmother     Cancer Other 38        colon cancer        Review of Systems   Constitutional:  Positive for fatigue. Negative for fever and unexpected weight change.   HENT:  Negative for congestion, mouth sores, nosebleeds, postnasal drip, rhinorrhea, sore throat and trouble swallowing.    Eyes:  Negative for visual disturbance.   Respiratory:  Negative for apnea, cough, choking, chest tightness, shortness of breath and wheezing.    Cardiovascular:  Negative for chest pain, palpitations and leg swelling.   Gastrointestinal:  Negative for abdominal pain, constipation, diarrhea, nausea and vomiting.   Genitourinary:  Negative for difficulty urinating.   Musculoskeletal:  Negative for arthralgias, back pain, gait problem and myalgias.   Neurological:  Negative for dizziness, weakness and headaches.   Psychiatric/Behavioral:  Positive for sleep disturbance.        Vitals:    08/20/24 1532   BP: 130/72   Pulse: 69   Resp: 16   Temp: 98.6 °F (37 °C)      SpO2: 97 %  Ht Readings from Last 1 Encounters:   08/20/24 6' (1.829 m)     Wt Readings from Last 1 Encounters:   08/20/24 215  lb (97.5 kg)     Body mass index is 29.16 kg/m².     Physical Exam  Constitutional:       General: He is not in acute distress.     Appearance: Normal appearance. He is not ill-appearing or diaphoretic.   HENT:      Head: Normocephalic and atraumatic.      Nose: Nose normal. No congestion or rhinorrhea.      Comments: Very narrow nares L>R with edematous mucosa      Mouth/Throat:      Mouth: Mucous membranes are moist.      Pharynx: Oropharynx is clear. No oropharyngeal exudate or posterior oropharyngeal erythema.      Comments: Mallampati class IV palate   Eyes:      Extraocular Movements: Extraocular movements intact.      Pupils: Pupils are equal, round, and reactive to light.   Cardiovascular:      Rate and Rhythm: Normal rate.      Pulses: Normal pulses.      Heart sounds: Normal heart sounds. No murmur heard.  Pulmonary:      Effort: Pulmonary effort is normal. No respiratory distress.      Breath sounds: Normal breath sounds. No wheezing or rhonchi.   Chest:      Chest wall: No tenderness.   Abdominal:      General: Abdomen is flat. Bowel sounds are normal.      Palpations: Abdomen is soft.   Musculoskeletal:         General: Normal range of motion.   Skin:     General: Skin is warm.   Neurological:      General: No focal deficit present.      Mental Status: He is alert and oriented to person, place, and time.   Psychiatric:         Mood and Affect: Mood normal.         Behavior: Behavior normal.         Thought Content: Thought content normal.         Judgment: Judgment normal.             Labs:  Last BMP  Lab Results   Component Value Date    GLU 99 07/15/2024    BUN 20 07/15/2024    CREATSERUM 1.36 (H) 07/15/2024    BUNCREA 15.8 02/29/2020    ANIONGAP 8 07/15/2024    GFRAA 72 05/18/2022    GFRNAA 63 05/18/2022    CA 8.8 07/15/2024     07/15/2024    K 4.1 07/15/2024     07/15/2024    CO2 25.0 07/15/2024    OSMOCALC 297 (H) 07/15/2024      Last CBC  Lab Results   Component Value Date    WBC 5.8  07/15/2024    RBC 5.29 07/15/2024    HGB 16.1 07/15/2024    HCT 47.8 07/15/2024    MCV 90.4 07/15/2024    MCH 30.4 07/15/2024    MCHC 33.7 07/15/2024    RDW 13.2 07/15/2024    .0 07/15/2024      Last CMP  Lab Results   Component Value Date    GLU 99 07/15/2024    BUN 20 07/15/2024    BUNCREA 15.8 02/29/2020    CREATSERUM 1.36 (H) 07/15/2024    ANIONGAP 8 07/15/2024    GFR 67 07/05/2016    GFRNAA 63 05/18/2022    GFRAA 72 05/18/2022    CA 8.8 07/15/2024    OSMOCALC 297 (H) 07/15/2024    ALKPHO 90 07/15/2024    AST 11 (L) 07/15/2024    ALT 30 07/15/2024    BILT 0.4 07/15/2024    TP 7.3 07/15/2024    ALB 3.8 07/15/2024    GLOBULIN 3.5 07/15/2024     07/15/2024    K 4.1 07/15/2024     07/15/2024    CO2 25.0 07/15/2024      Last Thyroid Function  Lab Results   Component Value Date    T4F 0.9 07/15/2024    TSH 4.200 (H) 07/15/2024        Imaging:  No results found.     [facility]       Accredited by the American Academy of Sleep Medicine (AASM)     PATIENT'S NAME:        CELSO CRAIG  ATTENDING PHYSICIAN:   Jayce Ritter MD  REFERRING PHYSICIAN:   TORSTEN Cherry  PATIENT ACCOUNT #:     [id number]        LOCATION:       [facility]  MEDICAL RECORD #:      [id number]        YOB: 1965  DATE OF STUDY:         08/08/2024     SLEEP STUDY REPORT     STUDY TYPE:  Unattended sleep study.     PATIENT CHARACTERISTICS:  Age 59 years.  Gender male.     HISTORY:  The patient is a 59-year-old female with history of GERD, hemorrhoids, acute back ache, and hyposomnia with snoring. This home sleep study is performed for evaluation of obstructive sleep apnea syndrome.     UNATTENDED SLEEP STUDY RECORDING PARAMETERS:  The patient underwent a formal technically adequate unattended diagnostic sleep study coordinated with the [facility].  The study was performed in accordance with the AASM standard for Out of Center Sleep Testing.  The four-channel Type III HST measures the  following parameters:  flow, respiratory effort, pulse, and oxygen saturation.     SCORING:  This study was scored in accordance with AASM scoring rules and Medicare rule 1B.     FINDINGS:  The flow evaluation time began at 9:43 p.m. and ended at 5:41 a.m. with a duration of 7 hours and 56 minutes.  Overall apnea-hypopnea index was 41.9 events per hour.  Supine AHI was 42 events per hour.  SpO2 jon was 81% and oxygen saturation was less than 88% for 17 minutes.  Mild snoring was noted during sleep study recording.  Average heart rate was 65 beats per minute, and maximum heart rate 93 beats per minute.     IMPRESSION:  Overall apnea-hypopnea index of 41.9 events per hour is consistent with severe obstructive sleep apnea syndrome.  SpO2 jon was 81% and oxygen saturation was less than 88% for 17 minutes.     DIAGNOSIS:  Obstructive sleep apnea syndrome (G47.33).     PLAN:    1.       At the request of referring physician, we will confer with the patient regarding the results of the sleep study and make treatment recommendations.  2.       The patient is a candidate for treatment with positive airway pressure (PAP) advised as first-line therapy.  Alternatives include oral appliance therapy and evaluation of upper airway by ear, nose, and throat specialist.  3.       Continuous positive airway pressure (CPAP) titration sleep study should be completed.  4.       Advise the patient to avoid alcoholic beverages and medications that are respiratory depressants and, if drowsy, use caution when driving or operating machinery.     Dictated By Jayce Ritter MD  d:08/11/2024 22:36:26     [facility]       Accredited by the American Academy of Sleep Medicine (AASM)     PATIENT'S NAME:        CELSO CRAIG  REFERRING PHYSICIAN:   Diamond Klein M.D., D.A.B.S.M.  CONSULTING PHYSICIAN:  Diamond Klein M.D., D.A.B.S.M.  PATIENT ACCOUNT #:     [id number]         [facility]       [facility]  MEDICAL RECORD #:       [id number]        YOB: 1965  DATE OF STUDY:         10/12/2014     SLEEP STUDY REPORT     STUDY TYPE:  Diagnostic polysomnogram.     ORDERING PHYSICIAN:  Huan Wolfe M.D.     CLINICAL INDICATION:  A 49-year-old male, height 6 feet, weight 210  pounds, BMI 29, neck circumference 16 inches, with history of daytime  sleepiness and fatigue, snoring, witnessed apnea, frequent nocturnal  awakenings, unrefreshing sleep.  Bedtime 10 p.m., rise time 5 a.m.  [place] Sleepiness Scale mildly elevated at 10.     MEDICAL HISTORY:  Asthma, sinus problems.     DIAGNOSTIC RECORDING PARAMETERS:  The patient underwent a formal  polysomnographic evaluation at the [facility]. The study was  acquired in compliance with the AASM Manual for the Scoring of Sleep  and Associated Events. The following parameters were monitored: EOG,  EEG, ECG, chin EMG, left and right tibialis EMG, snore microphone, an  oronasal thermal sensor, nasal pressure transducer, respiratory  inductance plethysmography, and oxygen saturation via continuous  pulse oximetry. In accordance with AASM recommendations, hypopnea  events are scored based on an oxygen saturation more than or equal to  4 percent.  Body position is documented via technician notes every 15  minutes.     RESPIRATORY FINDINGS:  Apnea-hypopnea index 23 events per hour with  associated oxygen jon of 84%.  Apneas and hypopneas totaled 140 (4  obstructive apneas, 2 central apneas, 134 hypopneas).  Central apneas  were postarousal in nature.  Apneas events and oxygen desaturations  were increased during REM, with a REM AHI of 48, non-REM AHI 13.  The  majority of study was spent in the supine position with only 3% of  recording spent in the lateral position, thus positional component to  sleep-disordered breathing could not be fully assessed, but during  limited lateral sleep, the patient had a lateral AHI of 5 events per  hour.  Moderate to loud primary snoring was also  seen intermittently  with frequent snore-related arousals.     OXYGENATION:  The patient spent 1 minute or less than 1% of sleep  time with oxygen below 88%, reaching a jon of 84%.  Average oxygen  94%.     SLEEP ARCHITECTURE:  Total sleep time 363 minutes.  Sleep efficiency  85%.  Sleep latency 11 minutes.  REM latency 181 minutes.  Lights off  10:08 p.m. Lights on 5:15 a.m. Wake after sleep onset 50 minutes.     SLEEP STAGING:  Stage 1, 9%; stage 2, 62%; stage 3-4 sleep 0%.  REM  elevated at 29%.     BODY POSITION:  Supine sleep comprised 97% of sleep time.     EKG:  EKG was sinus rhythm with average heart rate of 62 beats per  minute.  No cardiac abnormalities were seen.     EMG:  Periodic limb movements were not observed.  REM EMG was normal.        ENCEPHALOGRAM:  EEG was normal.     DIAGNOSIS:  327.23 Obstructive sleep apnea.     IMPRESSION:  1.    Moderate obstructive sleep apnea with an AHI of 23 events per  hour and associated oxygen jon of 84%.  2.    Apnea events were increased during REM sleep with a REM AHI of  48 events per hour.  3.    Moderate to loud primary snoring was seen.  4.    The majority of study was spent in the supine position.  During  very limited lateral sleep, the patient did have reduced apnea  events.     RECOMMENDATIONS:  1.    Treatment options for sleep apnea could include positive airway  pressure treatment, CPAP, oromandibular device, or ENT  evaluation/surgical intervention.  2.    Reduction of upper airway resistance through evaluation and  treatment of nasal or hypopharyngeal sites of constriction or  obstruction including allergic rhinitis, weight loss, and avoidance  of the supine position may also be beneficial.  3.    The patient should avoid respiratory depressants including  alcohol, particularly within 3 to 4 hours of bedtime.     Thank you for support of [facility].  If formal sleep  consultation desired, feel free to contact us at [phone].      Dictated By Diamond Klein M.D., D.A.B.S.M.  d:    10/16/2014 09:37:55    This is a 59 year old male who presents with the following symptoms, risk factors, behaviors or other items associated with sleep problems.     Sleep Apnea:   snoring; reflux during sleep; gasping/choking; stops breathing; mouth breathing; non-refreshing sleep; overweight; previous sleep study  Insomnia:  waking too early; non-refreshing sleep; mind racing; job stress  Restless Leg:  no symptoms or restless legs  Parasomnias:   no symptoms of parasomnias  Daytime Problems:  sleepiness; fatigue; irritable/tavarez; dificulty concentrating; inattentiveness; memory problems; previous sleep study            [place] Sleepiness Scale: (ESS) score on today's visit is 11  out of 24.     Score total of 1-6    Normal sleep   Score total of 7-8    Average sleepiness   Score total of 9-24    Abnormal (possibly pathologic) sleepiness       Impression:    Obstructive sleep apnea syndrome (OSAS): Home sleep study performed on 8/8/2024  showed  Overall apnea-hypopnea index of 41.9 events per hour is consistent with severe obstructive sleep apnea syndrome.  SpO2 jon was 81% and oxygen saturation was less than 88% for 17 minutes.  Daytime hypersomnolence/fatigue  Overweight   ;  Body mass index is 29.16 kg/m².  Allergic rhinitis: hx allergy to cats and dogs    Asthma, intermittent    GERD  Mildly elevated Cr                                Plan:    Schedule CPAP titration sleep study to be interpreted by Dr. Jayce Rittre, will then arrange a NEW CPAP machine   Advised about weight loss   Advised against drowsy driving and to avoid alcoholic beverage and respiratory depressants as these may worsen sleep apnea      Follow up: 3  months     Thank you for allowing me to participate in your patient care.    WALLY Ritter MD, FACP, FCCP, FAASM - Pulmonary/Critical care/Sleep Medicine  Please contact our office if you have any questions or concerns at  [phone]    Note to the patient: The 21st Century Cures Act makes medical notes like these available to patients in the interest of transparency. However, be advised that this is a medical document. It is intended as peer to peer communication. It is written in medical language and may contain abbreviations or verbiage that are unfamiliar. It may appear blunt or direct. Medical documents are intended to carry relevant information, facts as evident, and clinical opinion of the practitioner.      Disclaimer: Components of this note were documented using voice recognition system and are subject to errors not corrected at proofreading. Contact the author of this note for any clarifications

## 2024-08-20 NOTE — PROGRESS NOTES
This is a 59 year old male who presents with the following symptoms, risk factors, behaviors or other items associated with sleep problems.    Sleep Apnea:   snoring; reflux during sleep; gasping/choking; stops breathing; mouth breathing; non-refreshing sleep; overweight; previous sleep study  Insomnia:  waking too early; non-refreshing sleep; mind racing; job stress  Restless Leg:  no symptoms or restless legs  Parasomnias:   no symptoms of parasomnias  Daytime Problems:  sleepiness; fatigue; irritable/tavarez; dificulty concentrating; inattentiveness; memory problems; previous sleep study    The patient's [place] Sleepiness score is 11/24.

## 2024-09-11 ENCOUNTER — OFFICE VISIT (OUTPATIENT)
Dept: INTERNAL MEDICINE CLINIC | Facility: CLINIC | Age: 59
End: 2024-09-11
Payer: COMMERCIAL

## 2024-09-11 VITALS
TEMPERATURE: 97 F | DIASTOLIC BLOOD PRESSURE: 70 MMHG | HEART RATE: 72 BPM | OXYGEN SATURATION: 96 % | RESPIRATION RATE: 16 BRPM | BODY MASS INDEX: 28.53 KG/M2 | SYSTOLIC BLOOD PRESSURE: 116 MMHG | WEIGHT: 210.63 LBS | HEIGHT: 72.05 IN

## 2024-09-11 DIAGNOSIS — N28.9 RENAL INSUFFICIENCY: ICD-10-CM

## 2024-09-11 DIAGNOSIS — G47.19 DAYTIME HYPERSOMNOLENCE: ICD-10-CM

## 2024-09-11 DIAGNOSIS — Z23 NEED FOR VACCINATION: ICD-10-CM

## 2024-09-11 DIAGNOSIS — Z00.00 ROUTINE GENERAL MEDICAL EXAMINATION AT A HEALTH CARE FACILITY: Primary | ICD-10-CM

## 2024-09-11 DIAGNOSIS — K21.9 GASTROESOPHAGEAL REFLUX DISEASE, UNSPECIFIED WHETHER ESOPHAGITIS PRESENT: ICD-10-CM

## 2024-09-11 DIAGNOSIS — E78.5 DYSLIPIDEMIA: ICD-10-CM

## 2024-09-11 DIAGNOSIS — R79.89 ELEVATED TSH: ICD-10-CM

## 2024-09-11 DIAGNOSIS — G47.33 OSA (OBSTRUCTIVE SLEEP APNEA): ICD-10-CM

## 2024-09-11 DIAGNOSIS — J30.1 NON-SEASONAL ALLERGIC RHINITIS DUE TO POLLEN: ICD-10-CM

## 2024-09-11 PROCEDURE — 3078F DIAST BP <80 MM HG: CPT | Performed by: NURSE PRACTITIONER

## 2024-09-11 PROCEDURE — 90471 IMMUNIZATION ADMIN: CPT | Performed by: NURSE PRACTITIONER

## 2024-09-11 PROCEDURE — 99396 PREV VISIT EST AGE 40-64: CPT | Performed by: NURSE PRACTITIONER

## 2024-09-11 PROCEDURE — 3074F SYST BP LT 130 MM HG: CPT | Performed by: NURSE PRACTITIONER

## 2024-09-11 PROCEDURE — 3008F BODY MASS INDEX DOCD: CPT | Performed by: NURSE PRACTITIONER

## 2024-09-11 PROCEDURE — 90715 TDAP VACCINE 7 YRS/> IM: CPT | Performed by: NURSE PRACTITIONER

## 2024-09-11 NOTE — PROGRESS NOTES
Renzo Silverio is a 59 year old male who presents for a complete physical exam.     HPI:   Pt complains of .  FRANDY  Dr Ritter.  Waiting for CPAP  needs overnight study.     Renal insufficiency.  Check UA and repeat 3 mo.  Nonfasting.      Mild dyslipidemia.  Family hx CAD  discussed and provided information on UFHS.     Elevated TSH with normal FT4.  Repeat.  Fatigue but also with untreated FRANDY.     Wt Readings from Last 6 Encounters:   09/11/24 210 lb 9.6 oz (95.5 kg)   08/20/24 215 lb (97.5 kg)   06/25/24 211 lb 12.8 oz (96.1 kg)   06/02/24 215 lb (97.5 kg)   07/05/23 210 lb (95.3 kg)   06/20/22 208 lb (94.3 kg)       Cholesterol, Total (mg/dL)   Date Value   07/15/2024 187   02/29/2020 190     HDL Cholesterol (mg/dL)   Date Value   07/15/2024 44   02/29/2020 47     LDL Cholesterol (mg/dL)   Date Value   07/15/2024 123 (H)   02/29/2020 132 (H)     AST (U/L)   Date Value   07/15/2024 11 (L)   05/18/2022 13 (L)   02/29/2020 15     ALT (U/L)   Date Value   07/15/2024 30   05/18/2022 21   02/29/2020 24      Current Outpatient Medications   Medication Sig Dispense Refill    albuterol 108 (90 Base) MCG/ACT Inhalation Aero Soln Inhale 2 puffs into the lungs every 6 (six) hours as needed for Wheezing. 1 each 0    Famotidine (PEPCID OR) Take by mouth daily as needed.      Ibuprofen (MOTRIN OR) daily as needed.        Past Medical History:    Allergic rhinitis    Asthma (HCC)    Change in hair    Chest pain on exertion    Dizziness    Esophageal reflux    Fatigue    Heartburn    Hemorrhoids    Indigestion    Nausea    Shortness of breath    Sleep apnea    Sleep disturbance    Wears glasses    Weight gain    Wheezing      Past Surgical History:   Procedure Laterality Date    Colonoscopy      Vasectomy        Family History   Problem Relation Age of Onset    Asthma Mother     Other (Other) Mother         gallbladder     Heart Disorder Father 65        quadruple bypass    Mental Disorder Maternal Grandmother         dementia     Dementia Maternal Grandmother     Cancer Other 38        colon cancer           Health Maintenance  Immunizations: tdap today  will consider shingrix.    Immunization History   Administered Date(s) Administered    Covid-19 Vaccine Moderna 100 mcg/0.5 ml 04/08/2021, 05/13/2021    Covid-19 Vaccine Pfizer 30 mcg/0.3 ml 12/27/2021   Pended Date(s) Pended    TDAP 09/11/2024     Dental Visits:  yes   Colon cancer screening:    Health Maintenance   Topic Date Due    Colorectal Cancer Screening  07/06/2030      PSA:  No results found for: \"PSA\"  discussed annual monitoring.     PSA Screen:     Lab Results   Component Value Date    PSAS 2.06 07/15/2024    PSAS 1.50 02/29/2020         REVIEW OF SYSTEMS:   GENERAL: feels well otherwise  SKIN: denies any unusual skin lesions  EYES:denies blurred vision or double vision  HEENT: denies nasal congestion, sinus pain or ear discomfort  LUNGS: denies shortness of breath with exertion  CARDIOVASCULAR: denies chest pain on exertion  GI: denies abdominal pain,denies heartburn  : denies nocturia or changes in stream  MUSCULOSKELETAL: denies back pain  NEURO: denies headaches  PSYCH: denies depression or anxiety      EXAM:   /70 (BP Location: Left arm, Patient Position: Sitting, Cuff Size: large)   Pulse 72   Temp 97.4 °F (36.3 °C) (Temporal)   Resp 16   Ht 6' 0.05\" (1.83 m)   Wt 210 lb 9.6 oz (95.5 kg)   SpO2 96%   BMI 28.53 kg/m²   Body mass index is 28.53 kg/m².   GENERAL: well developed, well nourished,in no apparent distress  SKIN: no rashes,no suspicious lesions  HEENT: atraumatic, normocephalic,ears and throat are clear  EYES:PERRLA, EOMI, conjunctiva are clear  NECK: supple,no adenopathy,  LUNGS: clear to auscultation  CARDIO: RRR without murmur  GI: normal BS, soft, no masses, HSM or tenderness  MUSCULOSKELETAL: back is not tender  EXTREMITIES: no edema  NEURO: Oriented times three,cranial nerves are intact,motor and sensory are grossly intact    ASSESSMENT AND  PLAN:   Renzo Silverio is a 59 year old male who presents for a complete physical exam.     HEALTH MAINTENANCE: labs reviewed.  Will have shingrix at pharamcy.     Encounter Diagnoses   Name Primary?    Routine general medical examination at a health care facility Yes    Need for vaccination     Non-seasonal allergic rhinitis due to pollen     Daytime hypersomnolence  for in lab study and CPAP.  Dr Ritter.      FRANDY (obstructive sleep apnea)     Gastroesophageal reflux disease, unspecified whether esophagitis present  stable  monitor.      Dyslipidemia  stable  Diet, exercise, and lifestyle modification.  Consider UFHS with family hx CAD.       Renal insufficiency  repeat nonfasting.  Avoid NSAIds.      Elevated TSH  repeat         Orders Placed This Encounter   Procedures    Basic Metabolic Panel (8) [E]    TSH and Free T4    UA/M With Culture Reflex [E]    TdaP (Boostrix) Vaccine (> 7 Y)       Meds & Refills for this Visit:  Requested Prescriptions      No prescriptions requested or ordered in this encounter       Imaging & Consults:  TETANUS, DIPHTHERIA TOXOIDS AND ACELLULAR PERTUSIS VACCINE (TDAP), >7 YEARS, IM USE    No follow-ups on file.  There are no Patient Instructions on file for this visit.

## 2024-09-20 ENCOUNTER — PATIENT MESSAGE (OUTPATIENT)
Dept: INTERNAL MEDICINE CLINIC | Facility: CLINIC | Age: 59
End: 2024-09-20

## 2024-09-20 DIAGNOSIS — M54.50 ACUTE LOW BACK PAIN, UNSPECIFIED BACK PAIN LATERALITY, UNSPECIFIED WHETHER SCIATICA PRESENT: Primary | ICD-10-CM

## 2024-09-23 RX ORDER — CYCLOBENZAPRINE HCL 10 MG
10 TABLET ORAL 3 TIMES DAILY PRN
Qty: 20 TABLET | Refills: 0 | Status: SHIPPED | OUTPATIENT
Start: 2024-09-23 | End: 2024-09-30

## 2024-09-23 NOTE — TELEPHONE ENCOUNTER
From: Renzo Silverio  To: Melissa Martinez  Sent: 9/20/2024 1:41 PM CDT  Subject: Prescription Refill    You Leslie - would it be possible to get a refill on the cyclobenzaprine for my back?     Thank you,   Renzo

## 2024-09-23 NOTE — TELEPHONE ENCOUNTER
Last time medication was refilled: 6/2/24  Next office visit due/scheduled: no apt  Last office visit: 9/11/24  Last Labs: 7/15/24  Med has been pended.   Please advise.

## 2024-09-26 ENCOUNTER — OFFICE VISIT (OUTPATIENT)
Dept: SLEEP CENTER | Age: 59
End: 2024-09-26
Attending: INTERNAL MEDICINE
Payer: COMMERCIAL

## 2024-09-26 DIAGNOSIS — G47.33 OSA (OBSTRUCTIVE SLEEP APNEA): ICD-10-CM

## 2024-09-26 DIAGNOSIS — G47.19 DAYTIME HYPERSOMNOLENCE: ICD-10-CM

## 2024-09-26 PROCEDURE — 95811 POLYSOM 6/>YRS CPAP 4/> PARM: CPT

## 2024-10-02 ENCOUNTER — ORDER TRANSCRIPTION (OUTPATIENT)
Dept: ADMINISTRATIVE | Facility: HOSPITAL | Age: 59
End: 2024-10-02

## 2024-10-02 DIAGNOSIS — Z13.6 SCREENING FOR CARDIOVASCULAR CONDITION: Primary | ICD-10-CM

## 2024-10-04 ENCOUNTER — SLEEP STUDY (OUTPATIENT)
Facility: CLINIC | Age: 59
End: 2024-10-04
Payer: COMMERCIAL

## 2024-10-04 DIAGNOSIS — G47.33 OBSTRUCTIVE SLEEP APNEA: Primary | ICD-10-CM

## 2024-10-04 PROCEDURE — 95811 POLYSOM 6/>YRS CPAP 4/> PARM: CPT | Performed by: INTERNAL MEDICINE

## 2024-10-09 ENCOUNTER — OFFICE VISIT (OUTPATIENT)
Facility: LOCATION | Age: 59
End: 2024-10-09
Payer: COMMERCIAL

## 2024-10-09 VITALS — DIASTOLIC BLOOD PRESSURE: 83 MMHG | TEMPERATURE: 98 F | SYSTOLIC BLOOD PRESSURE: 131 MMHG | HEART RATE: 89 BPM

## 2024-10-09 DIAGNOSIS — M62.08 DIASTASIS RECTI: Primary | ICD-10-CM

## 2024-10-09 PROCEDURE — 3075F SYST BP GE 130 - 139MM HG: CPT | Performed by: SURGERY

## 2024-10-09 PROCEDURE — 99243 OFF/OP CNSLTJ NEW/EST LOW 30: CPT | Performed by: SURGERY

## 2024-10-09 PROCEDURE — 3079F DIAST BP 80-89 MM HG: CPT | Performed by: SURGERY

## 2024-10-09 NOTE — H&P
New Patient Visit Note       Active Problems      1. Diastasis recti        Chief Complaint   Chief Complaint   Patient presents with    New Patient     NP- Ventral Hernia- reports abdominal bulging, no pain        History of Present Illness     This patient is 60 yo male who presents as a NP to clinic for an epigastric bulge found during annual physical by Ms Mariama Martinez. Denies pain, change in size. Admits seeing and feeling bulge with palpation. Patient estimates the bulge has been there approx 1 yr. No previous abdominal surgery. No excess exercise or heavy lifting.     The patient denies fever, chills, chest pain, shortness of breath, dyspnea. The patient also denies hematemesis, melena, or hematochezia. The patient denies change in bowel or bladder habits. There is no complaint of hematuria or dysuria.    Allergies  Renzo is allergic to beer, dander, and nickel.    Past Medical / Surgical / Social / Family History    The past medical and past surgical history have been reviewed by me today.    Past Medical History:    Allergic rhinitis    Asthma (HCC)    Change in hair    Chest pain on exertion    Dizziness    Esophageal reflux    Fatigue    Heartburn    Hemorrhoids    Indigestion    Nausea    Shortness of breath    Sleep apnea    Sleep disturbance    Wears glasses    Weight gain    Wheezing     Past Surgical History:   Procedure Laterality Date    Colonoscopy      Vasectomy         The family history and social history have been reviewed by me today.    Family History   Problem Relation Age of Onset    Asthma Mother     Other (Other) Mother         gallbladder     Heart Disorder Father 65        quadruple bypass    Mental Disorder Maternal Grandmother         dementia    Dementia Maternal Grandmother     Cancer Other 38        colon cancer     Social History     Socioeconomic History    Marital status:    Occupational History    Occupation:      Comment: Financial exchange   Tobacco Use     Smoking status: Never     Passive exposure: Never    Smokeless tobacco: Never   Vaping Use    Vaping status: Never Used   Substance and Sexual Activity    Alcohol use: Not Currently    Drug use: Never        Current Outpatient Medications:     albuterol 108 (90 Base) MCG/ACT Inhalation Aero Soln, Inhale 2 puffs into the lungs every 6 (six) hours as needed for Wheezing., Disp: 1 each, Rfl: 0    Famotidine (PEPCID OR), Take by mouth daily as needed., Disp: , Rfl:     Ibuprofen (MOTRIN OR), daily as needed., Disp: , Rfl:       Review of Systems  The Review of Systems has been reviewed by me during today.  Review of Systems   Constitutional:  Negative for chills, diaphoresis, fatigue, fever and unexpected weight change.   HENT:  Negative for hearing loss, nosebleeds, sore throat and trouble swallowing.    Respiratory:  Negative for apnea, cough, shortness of breath and wheezing.    Cardiovascular:  Negative for chest pain, palpitations and leg swelling.   Gastrointestinal:  Negative for abdominal distention, abdominal pain, anal bleeding, blood in stool, constipation, diarrhea, nausea and vomiting.   Genitourinary:  Negative for difficulty urinating, dysuria, frequency and urgency.   Musculoskeletal:  Negative for arthralgias and myalgias.   Skin:  Negative for color change and rash.   Neurological:  Negative for tremors, syncope and weakness.   Hematological:  Negative for adenopathy. Does not bruise/bleed easily.   Psychiatric/Behavioral:  Negative for behavioral problems and sleep disturbance.        Physical Findings   /83   Pulse 89   Temp 97.8 °F (36.6 °C) (Temporal)   Physical Exam  Vitals and nursing note reviewed.   Constitutional:       General: He is not in acute distress.     Appearance: He is well-developed.   HENT:      Head: Normocephalic and atraumatic.   Eyes:      General: No scleral icterus.     Pupils: Pupils are equal, round, and reactive to light.   Neck:      Vascular: No JVD.       Trachea: No tracheal deviation.   Cardiovascular:      Rate and Rhythm: Normal rate and regular rhythm.   Pulmonary:      Effort: Pulmonary effort is normal. No respiratory distress.      Breath sounds: No stridor.   Abdominal:      General: Bowel sounds are normal. There is no distension.      Palpations: Abdomen is soft. Abdomen is not rigid. There is no mass.      Tenderness: There is no abdominal tenderness. There is no guarding or rebound. Negative signs include Ivey's sign and McBurney's sign.      Hernia: No hernia is present. There is no hernia in the umbilical area or ventral area.       Musculoskeletal:         General: Normal range of motion.      Cervical back: Normal range of motion and neck supple.   Skin:     General: Skin is warm and dry.   Neurological:      Mental Status: He is alert and oriented to person, place, and time.   Psychiatric:         Behavior: Behavior normal.             Assessment   1. Diastasis recti          Plan     The patient has diastases recti in the epigastric region with no hernia defect.    No general surgical intervention warranted.    I explained to the patient that this does not represent a hernia and that there is no danger with physical activity causing obstruction or strangulation.    Should the patient desire repair or correction, he is to make an appointment with plastic surgeon for further recommendations.  I offered names of plastic surgeons if the patient wishes to consider this option.    The patient will return to my attention on as-needed basis.    The patient was provided ample opportunity to ask questions.  All of the patient's questions were answered in detail.  The patient voiced understanding of the care plan.     No orders of the defined types were placed in this encounter.      Imaging & Referrals   None    Follow Up  No follow-ups on file.    Chuck Mcbride MD

## 2024-10-22 ENCOUNTER — MED REC SCAN ONLY (OUTPATIENT)
Facility: CLINIC | Age: 59
End: 2024-10-22

## 2024-10-27 ENCOUNTER — HOSPITAL ENCOUNTER (OUTPATIENT)
Dept: CT IMAGING | Age: 59
Discharge: HOME | End: 2024-10-27
Attending: NURSE PRACTITIONER

## 2024-10-27 DIAGNOSIS — Z13.6 SCREENING FOR CARDIOVASCULAR CONDITION: ICD-10-CM

## 2024-12-17 NOTE — PROGRESS NOTES
[facility]  SLEEP PROGRESS NOTE        HPI:   This is a 59 year old male coming in for   Chief Complaint   Patient presents with    Apnea   31-90 day    HPI:     Notices spikes in AHI depending on day  Usually sleeps on back, can roll onto side    No longer napping  No longer dozing on couch in evening  Feels more mentally alert, less crabby      In bed 10p  Out of bed 5-7a  SL quick  Nighttime awakenings occ 1x - feels very awake, can have hard time going back to sleep  Naps none  Caffeine 3-4 cans diet coke per day   Alcohol none     Denies leg cramps, restless legs, headaches, dry mouth, tinnitus, chest pain, thoracic back pain, bloating, drowsy driving, sleep walking, sleep talking  Teeth grinding - possible, not currently wearing dental guard     DME company: [employer]  Mask type: nasal pillows      SilverioRenzo tam Pancho  2024 - 2024  Patient ID: [id number]  : 1965  Age: 59 years  Gender: Male  [address]  [place]  [address] [facility], [address]  Compliance Report  Usage 2024 - 2024  Usage days 45/45 days (100%)  >= 4 hours 45 days (100%)  < 4 hours 0 days (0%)  Usage hours 365 hours 33 minutes  Average usage (total days) 8 hours 7 minutes  Average usage (days used) 8 hours 7 minutes  Median usage (days used) 8 hours 9 minutes  Total used hours (value since last reset - 2024) 431 hours  AirSense 11 AutoSet  Serial number [id number]  Mode CPAP  Set pressure 7 cmH2O  EPR Fulltime  EPR level 1  Therapy  Leaks - L/min Median: 0.2 95th percentile: 7.3 Maximum: 17.4  Events per hour AI: 4.2 HI: 0.5 AHI: 4.7  Apnea Index Central: 2.5 Obstructive: 1.6 Unknown: 0.1  RERA Index 0.1  Cheyne-Guerra respiration (average duration per night) 0 minutes (0%)    This is a 59 year old male who presents with the following symptoms, risk factors, behaviors or other items associated with sleep problems.     Sleep Apnea:   (Patient-Rptd)  snoring; reflux during sleep; stops breathing; wakes with dry mouth; mouth breathing; restless sleep; non-refreshing sleep; overweight; current CPAP use  Insomnia:  (Patient-Rptd) waking too early; non-refreshing sleep; restless sleep; not getting enough sleep; mind racing  Restless Leg:  (Patient-Rptd) no symptoms or restless legs  Parasomnias:   (Patient-Rptd) no symptoms of parasomnias  Daytime Problems:  (Patient-Rptd) sleepiness; fatigue; dificulty concentrating     The patient's [place] Sleepiness score is (Patient-Rptd) 3/24.    Patient: Sleep review of systems today: see form.    8/8/2024 HST  IMPRESSION: Overall apnea-hypopnea index of 41.9 events per hour is consistent with severe obstructive sleep apnea syndrome. SpO2 jon was 81% and oxygen saturation was less than 88% for 17 minutes.     10/12/2014 PSG  RESPIRATORY FINDINGS:  Apnea-hypopnea index 23 events per hour with  associated oxygen jon of 84%.  Apneas and hypopneas totaled 140 (4  obstructive apneas, 2 central apneas, 134 hypopneas).  Central apneas  were postarousal in nature.  Apneas events and oxygen desaturations  were increased during REM, with a REM AHI of 48, non-REM AHI 13.  The  majority of study was spent in the supine position with only 3% of  recording spent in the lateral position, thus positional component to  sleep-disordered breathing could not be fully assessed, but during  limited lateral sleep, the patient had a lateral AHI of 5 events per  hour.  Moderate to loud primary snoring was also seen intermittently  with frequent snore-related arousals.      Pt  PCP:  Adam Schriedel, MD  No referring provider defined for this encounter.           No data to display                    Past Medical History:    Allergic rhinitis    Asthma (HCC)    Change in hair    Chest pain on exertion    Dizziness    Esophageal reflux    Fatigue    Heartburn    Hemorrhoids    Indigestion    Nausea    Shortness of breath    Sleep apnea    Sleep  disturbance    Wears glasses    Weight gain    Wheezing     Past Surgical History:   Procedure Laterality Date    Colonoscopy      Vasectomy       Social History:  Social History     Social History Narrative    Not on file     Social History     Socioeconomic History    Marital status:    Occupational History    Occupation:      Comment: Financial exchange   Tobacco Use    Smoking status: Never     Passive exposure: Never    Smokeless tobacco: Never   Vaping Use    Vaping status: Never Used   Substance and Sexual Activity    Alcohol use: Not Currently    Drug use: Never     Family History:  Family History   Problem Relation Age of Onset    Asthma Mother     Other (Other) Mother         gallbladder     Heart Disorder Father 65        quadruple bypass    Mental Disorder Maternal Grandmother         dementia    Dementia Maternal Grandmother     Cancer Other 38        colon cancer     Allergies:  Allergies[1]  Current Meds:  Current Outpatient Medications   Medication Sig Dispense Refill    albuterol 108 (90 Base) MCG/ACT Inhalation Aero Soln Inhale 2 puffs into the lungs every 6 (six) hours as needed for Wheezing. 1 each 0    Famotidine (PEPCID OR) Take by mouth daily as needed.      Ibuprofen (MOTRIN OR) daily as needed.        Counseling given: Not Answered         Problem List:  Patient Active Problem List   Diagnosis    FRANDY (obstructive sleep apnea)- severe, AHI 41.9    Renal insufficiency    Dyslipidemia    Daytime hypersomnolence    Allergic rhinitis    Gastroesophageal reflux disease    Diastasis recti       REVIEW OF SYSTEMS:   Review of Systems  See HPI     EXAM:   /70 (BP Location: Right arm, Patient Position: Sitting, Cuff Size: large)   Pulse 78   Resp 16   Ht 6' (1.829 m)   Wt 219 lb (99.3 kg)   SpO2 97%   BMI 29.70 kg/m²  Estimated body mass index is 29.7 kg/m² as calculated from the following:    Height as of this encounter: 6' (1.829 m).    Weight as of this encounter:  219 lb (99.3 kg).   Neck in inches:      Wt Readings from Last 6 Encounters:   12/18/24 219 lb (99.3 kg)   09/11/24 210 lb 9.6 oz (95.5 kg)   08/20/24 215 lb (97.5 kg)   06/25/24 211 lb 12.8 oz (96.1 kg)   06/02/24 215 lb (97.5 kg)   07/05/23 210 lb (95.3 kg)     BP Readings from Last 3 Encounters:   12/18/24 120/70   10/09/24 131/83   09/11/24 116/70     Pulse Readings from Last 3 Encounters:   12/18/24 78   10/09/24 89   09/11/24 72     SpO2 Readings from Last 3 Encounters:   12/18/24 97%   09/11/24 96%   08/20/24 97%      Patient weight not recorded    Vital signs reviewed.  Physical Exam  Vitals and nursing note reviewed.   Constitutional:       Appearance: Normal appearance.   HENT:      Head: Normocephalic and atraumatic.      Right Ear: External ear normal.      Left Ear: External ear normal.   Pulmonary:      Effort: Pulmonary effort is normal. No respiratory distress.   Musculoskeletal:      Cervical back: Normal range of motion and neck supple.   Neurological:      General: No focal deficit present.      Mental Status: He is alert and oriented to person, place, and time.   Psychiatric:         Attention and Perception: Attention and perception normal.         Mood and Affect: Mood and affect normal.         Speech: Speech normal.         Behavior: Behavior normal. Behavior is cooperative.         Thought Content: Thought content normal.         Cognition and Memory: Cognition and memory normal.         Judgment: Judgment normal.         ASSESSMENT AND PLAN:   1. FRANDY (obstructive sleep apnea)- severe, AHI 41.9  - Apnea is at acceptable levels  - Repeat DL in 1 month to reassess residual AHI  - Continue current pressures and mask  - Needs new supplies  - RTO in 6 months    Patient is using and benefiting from regular cpap use.  Patient was instructed to clean equipment on a weekly basis.  Patient was instructed to keep up to date with supplies.  Patient was informed to avoid drowsy driving, or using heavy  machinery.    2. Gastroesophageal reflux disease, unspecified whether esophagitis present      There are no Patient Instructions on file for this visit.    Independent interpretation of Sleep Download as defined above.  Continue with Rx management of Sleep apnea with PAP therapy.    COMPLIANCE is required by insurance for 4 hours a night most nights of the week.    Advised if still with sleep apnea and not using CPAP has a 7 fold increase in risk of heart attack, stroke, abnormal heart rhythm  and death,  increased risk of driving accidents.     Advised to refrain from driving when sleepy.      Recommend weight loss, and maintain and optimal BMI with Exercise 30 minutes most days to target heart rate .     Advised patient to change filters,masks,hoses  and tubes and equiptment on a  regular schedule.    Filters and seals shall be changed every 1 month,  Hoses every 3 months,   CPAP mask and humidifier chamber changed every 6 month  with the durable medical equipment provider.         Meds & Refills for this Visit:  Requested Prescriptions      No prescriptions requested or ordered in this encounter       Outcome: Parent verbalizes understanding. Parent is notified to call with any questions, complications, allergies, or worsening or changing symptoms.  Parent is to call with any side effects or complications from the treatments as a result of today.     \" This note was created utilizing Dragon speech recognition software.  Please excuse any grammatical errors. Call my office if you have any questions regarding this note. \"     Heather BROWN LPN  12/17/2024  2:40 PM         [1]   Allergies  Allergen Reactions    Beer WHEEZING    Dander WHEEZING     Cats and dogs    Nickel RASH

## 2024-12-18 ENCOUNTER — OFFICE VISIT (OUTPATIENT)
Facility: CLINIC | Age: 59
End: 2024-12-18
Payer: COMMERCIAL

## 2024-12-18 ENCOUNTER — LAB ENCOUNTER (OUTPATIENT)
Dept: LAB | Age: 59
End: 2024-12-18
Attending: NURSE PRACTITIONER
Payer: COMMERCIAL

## 2024-12-18 VITALS
HEIGHT: 72 IN | SYSTOLIC BLOOD PRESSURE: 120 MMHG | RESPIRATION RATE: 16 BRPM | DIASTOLIC BLOOD PRESSURE: 70 MMHG | OXYGEN SATURATION: 97 % | BODY MASS INDEX: 29.66 KG/M2 | WEIGHT: 219 LBS | HEART RATE: 78 BPM

## 2024-12-18 DIAGNOSIS — G47.33 OSA (OBSTRUCTIVE SLEEP APNEA): Primary | ICD-10-CM

## 2024-12-18 DIAGNOSIS — K21.9 GASTROESOPHAGEAL REFLUX DISEASE, UNSPECIFIED WHETHER ESOPHAGITIS PRESENT: ICD-10-CM

## 2024-12-18 DIAGNOSIS — N28.9 RENAL INSUFFICIENCY: ICD-10-CM

## 2024-12-18 DIAGNOSIS — R79.89 ELEVATED TSH: ICD-10-CM

## 2024-12-18 LAB
ANION GAP SERPL CALC-SCNC: 8 MMOL/L (ref 0–18)
BILIRUB UR QL STRIP.AUTO: NEGATIVE
BUN BLD-MCNC: 21 MG/DL (ref 9–23)
CALCIUM BLD-MCNC: 9.2 MG/DL (ref 8.7–10.4)
CHLORIDE SERPL-SCNC: 107 MMOL/L (ref 98–112)
CLARITY UR REFRACT.AUTO: CLEAR
CO2 SERPL-SCNC: 26 MMOL/L (ref 21–32)
CREAT BLD-MCNC: 1.24 MG/DL
EGFRCR SERPLBLD CKD-EPI 2021: 68 ML/MIN/1.73M2 (ref 60–?)
FASTING STATUS PATIENT QL REPORTED: YES
GLUCOSE BLD-MCNC: 97 MG/DL (ref 70–99)
GLUCOSE UR STRIP.AUTO-MCNC: NORMAL MG/DL
KETONES UR STRIP.AUTO-MCNC: NEGATIVE MG/DL
LEUKOCYTE ESTERASE UR QL STRIP.AUTO: 75
NITRITE UR QL STRIP.AUTO: NEGATIVE
OSMOLALITY SERPL CALC.SUM OF ELEC: 295 MOSM/KG (ref 275–295)
PH UR STRIP.AUTO: 5 [PH] (ref 5–8)
POTASSIUM SERPL-SCNC: 4.3 MMOL/L (ref 3.5–5.1)
PROT UR STRIP.AUTO-MCNC: NEGATIVE MG/DL
RBC UR QL AUTO: NEGATIVE
SODIUM SERPL-SCNC: 141 MMOL/L (ref 136–145)
SP GR UR STRIP.AUTO: 1.03 (ref 1–1.03)
T4 FREE SERPL-MCNC: 1.1 NG/DL (ref 0.8–1.7)
TSI SER-ACNC: 3.67 UIU/ML (ref 0.55–4.78)
UROBILINOGEN UR STRIP.AUTO-MCNC: NORMAL MG/DL

## 2024-12-18 PROCEDURE — 3008F BODY MASS INDEX DOCD: CPT | Performed by: PHYSICIAN ASSISTANT

## 2024-12-18 PROCEDURE — 3078F DIAST BP <80 MM HG: CPT | Performed by: PHYSICIAN ASSISTANT

## 2024-12-18 PROCEDURE — 80048 BASIC METABOLIC PNL TOTAL CA: CPT | Performed by: NURSE PRACTITIONER

## 2024-12-18 PROCEDURE — 84439 ASSAY OF FREE THYROXINE: CPT | Performed by: NURSE PRACTITIONER

## 2024-12-18 PROCEDURE — 3074F SYST BP LT 130 MM HG: CPT | Performed by: PHYSICIAN ASSISTANT

## 2024-12-18 PROCEDURE — 84443 ASSAY THYROID STIM HORMONE: CPT | Performed by: NURSE PRACTITIONER

## 2024-12-18 PROCEDURE — 81001 URINALYSIS AUTO W/SCOPE: CPT | Performed by: NURSE PRACTITIONER

## 2024-12-18 PROCEDURE — 99214 OFFICE O/P EST MOD 30 MIN: CPT | Performed by: PHYSICIAN ASSISTANT

## 2024-12-18 PROCEDURE — 87086 URINE CULTURE/COLONY COUNT: CPT | Performed by: NURSE PRACTITIONER

## 2024-12-18 NOTE — PROGRESS NOTES
This is a 59 year old male who presents with the following symptoms, risk factors, behaviors or other items associated with sleep problems.    Sleep Apnea:   (Patient-Rptd) snoring; reflux during sleep; stops breathing; wakes with dry mouth; mouth breathing; restless sleep; non-refreshing sleep; overweight; current CPAP use  Insomnia:  (Patient-Rptd) waking too early; non-refreshing sleep; restless sleep; not getting enough sleep; mind racing  Restless Leg:  (Patient-Rptd) no symptoms or restless legs  Parasomnias:   (Patient-Rptd) no symptoms of parasomnias  Daytime Problems:  (Patient-Rptd) sleepiness; fatigue; dificulty concentrating    The patient's [place] Sleepiness score is (Patient-Rptd) 3/24.

## 2024-12-23 ENCOUNTER — TELEPHONE (OUTPATIENT)
Facility: CLINIC | Age: 59
End: 2024-12-23

## 2024-12-23 NOTE — TELEPHONE ENCOUNTER
ASSESSMENT AND PLAN:   1. FRANDY (obstructive sleep apnea)- severe, AHI 41.9  - Apnea is at acceptable levels  - Repeat DL in 1 month to reassess residual AHI  - Continue current pressures and mask  - Needs new supplies  - RTO in 6 months    Too soon to initials  new order for supplies. Original order sent 10/07/202.

## 2024-12-23 NOTE — TELEPHONE ENCOUNTER
Usage 11/23/2024 - 12/22/2024  Usage days 30/30 days (100%)  >= 4 hours 30 days (100%)  < 4 hours 0 days (0%)  Usage hours 238 hours 3 minutes  Average usage (total days) 7 hours 56 minutes  Average usage (days used) 7 hours 56 minutes  Median usage (days used) 8 hours 5 minutes  Total used hours (value since last reset - 12/22/2024) 475 hours  AirSense 11 AutoSet  Serial number [id number]  Mode CPAP  Set pressure 7 cmH2O  EPR Fulltime  EPR level 1  Therapy  Leaks - L/min Median: 0.3 95th percentile: 7.8 Maximum: 16.5  Events per hour AI: 3.0 HI: 0.5 AHI: 3.5  Apnea Index Central: 2.1 Obstructive: 0.8 Unknown: 0.0  RERA Index 0.1  Cheyne-Guerra respiration (average duration per night) 0 minutes (0%)

## 2025-06-03 NOTE — PROGRESS NOTES
Jean Claude Renzo Pancho  2025 - 2025  Patient ID: [id number]  : 1965  Age: 60 years  Gender: Male  [address]  [place]  [address] [facility] [place], [address]  Compliance Report  Usage 2025 - 2025  Usage days 90/90 days (100%)  >= 4 hours 89 days (99%)  < 4 hours 1 days (1%)  Usage hours 639 hours 30 minutes  Average usage (total days) 7 hours 6 minutes  Average usage (days used) 7 hours 6 minutes  Median usage (days used) 7 hours 13 minutes  Total used hours (value since last reset - 2025) 1,662 hours  AirSense 11 AutoSet  Serial number [id number]  Mode CPAP  Set pressure 7 cmH2O  EPR Fulltime  EPR level 1  Therapy  Leaks - L/min Median: 0.1 95th percentile: 7.4 Maximum: 13.9  Events per hour AI: 3.0 HI: 0.6 AHI: 3.6  Apnea Index Central: 1.6 Obstructive: 1.2 Unknown: 0.1  RERA Index 0.1  Cheyne-Guerra respiration (average duration per night) 0 minutes (0%)

## 2025-06-04 ENCOUNTER — OFFICE VISIT (OUTPATIENT)
Facility: CLINIC | Age: 60
End: 2025-06-04
Payer: COMMERCIAL

## 2025-06-04 VITALS
OXYGEN SATURATION: 99 % | TEMPERATURE: 98 F | HEART RATE: 76 BPM | SYSTOLIC BLOOD PRESSURE: 124 MMHG | BODY MASS INDEX: 30.2 KG/M2 | RESPIRATION RATE: 18 BRPM | DIASTOLIC BLOOD PRESSURE: 78 MMHG | WEIGHT: 223 LBS | HEIGHT: 72 IN

## 2025-06-04 DIAGNOSIS — K21.9 GASTROESOPHAGEAL REFLUX DISEASE, UNSPECIFIED WHETHER ESOPHAGITIS PRESENT: ICD-10-CM

## 2025-06-04 DIAGNOSIS — G47.33 OSA (OBSTRUCTIVE SLEEP APNEA): Primary | ICD-10-CM

## 2025-06-04 DIAGNOSIS — J30.9 ALLERGIC RHINITIS, UNSPECIFIED SEASONALITY, UNSPECIFIED TRIGGER: ICD-10-CM

## 2025-06-04 DIAGNOSIS — G47.19 DAYTIME HYPERSOMNOLENCE: ICD-10-CM

## 2025-06-04 PROCEDURE — 3008F BODY MASS INDEX DOCD: CPT | Performed by: INTERNAL MEDICINE

## 2025-06-04 PROCEDURE — 3078F DIAST BP <80 MM HG: CPT | Performed by: INTERNAL MEDICINE

## 2025-06-04 PROCEDURE — 99215 OFFICE O/P EST HI 40 MIN: CPT | Performed by: INTERNAL MEDICINE

## 2025-06-04 PROCEDURE — 3074F SYST BP LT 130 MM HG: CPT | Performed by: INTERNAL MEDICINE

## 2025-06-04 NOTE — PROGRESS NOTES
Pt fitted with Lanny Dreamwear large under the nose mask.   Pt was able to demonstrate on how to properly put on  Dreamwear UTN large and how to adjust the headgear.  Pt instructed  on proper cleaning and maintenance of Dreamwear UTN.   Instructed pt to update the DME company with the type of mask pt will be using so that DME will send  the right mask in pt's  next cpap re supply shipment.  Pt verbalized understanding of all instructions.

## 2025-06-04 NOTE — PROGRESS NOTES
Pulmonary/Critical Care/Sleep Medicine   Progress Note     PCP: Adam Schriedel, MD   Phone: [phone]   Fax: [phone]          Chief Complaint   Patient presents with    Obstructive Sleep Apnea (FRANDY)     Pt here for sleep follow up, pt states may want to try a different mask       HPI  I had the pleasure of seeing Renzo Silverio who is a pleasant 60 year old male who presents for follow up  of FRANDY after visit on 8/20/2025     The patient reports using CPAP daily at night at 7 cmH2O regularly throughout the night for about 7 hours and states that the  PAP machine is quiet and has a heated humidifier.  The patient denies  daytime hypersomnolence or fatigue. Although he is experiencing some pain in nostrils with nasal pillows       The patient denies kicking legs at night. Denies  teeth grinding.       He drinks 3 caffeine cans of soda daily.       He has no pets. Allergic to dogs and cats          Hx of tobacco use: He  reports that he has never smoked. He has never been exposed to tobacco smoke. He has never used smokeless tobacco.    Past Medical History:    Allergic rhinitis    Asthma (HCC)    Change in hair    Chest pain on exertion    Dizziness    Esophageal reflux    Fatigue    Heartburn    Hemorrhoids    Indigestion    Nausea    Shortness of breath    Sleep apnea    Sleep disturbance    Wears glasses    Weight gain    Wheezing      Past Surgical History:   Procedure Laterality Date    Colonoscopy      Vasectomy       Allergies   Allergen Reactions    Beer WHEEZING    Dander WHEEZING     Cats and dogs    Nickel RASH     Current Outpatient Medications   Medication Sig Dispense Refill    Famotidine (PEPCID OR) Take by mouth daily as needed.      Ibuprofen (MOTRIN OR) daily as needed.      albuterol 108 (90 Base) MCG/ACT Inhalation Aero Soln Inhale 2 puffs into the lungs every 6 (six) hours as needed for Wheezing. 1 each 0      Social History     Socioeconomic History    Marital status:     Occupational History    Occupation:      Comment: [employer]   Tobacco Use    Smoking status: Never     Passive exposure: Never    Smokeless tobacco: Never   Vaping Use    Vaping status: Never Used   Substance and Sexual Activity    Alcohol use: Not Currently    Drug use: Never      Immunization History   Administered Date(s) Administered    Covid-19 Vaccine Moderna 100 mcg/0.5 ml 04/08/2021, 05/13/2021    Covid-19 Vaccine Pfizer 30 mcg/0.3 ml 12/27/2021    TDAP 09/11/2024      Family History   Problem Relation Age of Onset    Asthma Mother     Other (Other) Mother         gallbladder     Heart Disorder Father 65        quadruple bypass    Mental Disorder Maternal Grandmother         dementia    Dementia Maternal Grandmother     Cancer Other 38        colon cancer        Review of Systems   Constitutional:  Negative for fatigue, fever and unexpected weight change.   HENT:  Negative for congestion, mouth sores, nosebleeds, postnasal drip, rhinorrhea, sore throat and trouble swallowing.    Eyes:  Negative for visual disturbance.   Respiratory:  Negative for apnea, cough, choking, chest tightness, shortness of breath and wheezing.    Cardiovascular:  Negative for chest pain, palpitations and leg swelling.   Gastrointestinal:  Negative for abdominal pain, constipation, diarrhea, nausea and vomiting.   Genitourinary:  Negative for difficulty urinating.   Musculoskeletal:  Negative for arthralgias, back pain, gait problem and myalgias.   Neurological:  Negative for dizziness, weakness and headaches.   Psychiatric/Behavioral:  Negative for sleep disturbance.        Vitals:    06/04/25 1056   BP: 124/78   Pulse: 76   Resp: 18   Temp: 97.7 °F (36.5 °C)      SpO2: 99 %  Ht Readings from Last 1 Encounters:   06/04/25 6' (1.829 m)     Wt Readings from Last 1 Encounters:   06/04/25 223 lb (101.2 kg)     Body mass index is 30.24 kg/m².     Physical Exam  Constitutional:       General: He is not in acute  distress.     Appearance: Normal appearance. He is not ill-appearing or diaphoretic.   HENT:      Head: Normocephalic and atraumatic.      Nose: Nose normal. No congestion or rhinorrhea.      Comments: Very narrow nares L>R with edematous mucosa      Mouth/Throat:      Mouth: Mucous membranes are moist.      Pharynx: Oropharynx is clear. No oropharyngeal exudate or posterior oropharyngeal erythema.      Comments: Mallampati class IV palate   Eyes:      Extraocular Movements: Extraocular movements intact.      Pupils: Pupils are equal, round, and reactive to light.   Cardiovascular:      Rate and Rhythm: Normal rate.      Pulses: Normal pulses.      Heart sounds: Normal heart sounds. No murmur heard.  Pulmonary:      Effort: Pulmonary effort is normal. No respiratory distress.      Breath sounds: Normal breath sounds. No wheezing or rhonchi.   Chest:      Chest wall: No tenderness.   Abdominal:      General: Abdomen is flat. Bowel sounds are normal.      Palpations: Abdomen is soft.   Musculoskeletal:         General: Normal range of motion.   Skin:     General: Skin is warm.   Neurological:      General: No focal deficit present.      Mental Status: He is alert and oriented to person, place, and time.   Psychiatric:         Mood and Affect: Mood normal.         Behavior: Behavior normal.         Thought Content: Thought content normal.         Judgment: Judgment normal.             Labs:  Last BMP  Lab Results   Component Value Date    GLU 97 12/18/2024    BUN 21 12/18/2024    CREATSERUM 1.24 12/18/2024    BUNCREA 15.8 02/29/2020    ANIONGAP 8 12/18/2024    GFRAA 72 05/18/2022    GFRNAA 63 05/18/2022    CA 9.2 12/18/2024     12/18/2024    K 4.3 12/18/2024     12/18/2024    CO2 26.0 12/18/2024    OSMOCALC 295 12/18/2024      Last CBC  Lab Results   Component Value Date    WBC 5.8 07/15/2024    RBC 5.29 07/15/2024    HGB 16.1 07/15/2024    HCT 47.8 07/15/2024    MCV 90.4 07/15/2024    MCH 30.4 07/15/2024     MCHC 33.7 07/15/2024    RDW 13.2 07/15/2024    .0 07/15/2024      Last CMP  Lab Results   Component Value Date    GLU 97 12/18/2024    BUN 21 12/18/2024    BUNCREA 15.8 02/29/2020    CREATSERUM 1.24 12/18/2024    ANIONGAP 8 12/18/2024    GFR 67 07/05/2016    GFRNAA 63 05/18/2022    GFRAA 72 05/18/2022    CA 9.2 12/18/2024    OSMOCALC 295 12/18/2024    ALKPHO 90 07/15/2024    AST 11 (L) 07/15/2024    ALT 30 07/15/2024    BILT 0.4 07/15/2024    TP 7.3 07/15/2024    ALB 3.8 07/15/2024    GLOBULIN 3.5 07/15/2024     12/18/2024    K 4.3 12/18/2024     12/18/2024    CO2 26.0 12/18/2024      Last Thyroid Function  Lab Results   Component Value Date    T4F 1.1 12/18/2024    TSH 3.674 12/18/2024        Imaging:  No results found.     [facility]       Accredited by the American Academy of Sleep Medicine (AASM)     PATIENT'S NAME:        CRAIGCELSO  ATTENDING PHYSICIAN:   Jayce Ritter MD  REFERRING PHYSICIAN:   TORSTEN Cherry  PATIENT ACCOUNT #:     [id number]        LOCATION:       Sleep Center  MEDICAL RECORD #:      [id number]        YOB: 1965  DATE OF STUDY:         08/08/2024     SLEEP STUDY REPORT     STUDY TYPE:  Unattended sleep study.     PATIENT CHARACTERISTICS:  Age 59 years.  Gender male.     HISTORY:  The patient is a 59-year-old female with history of GERD, hemorrhoids, acute back ache, and hyposomnia with snoring. This home sleep study is performed for evaluation of obstructive sleep apnea syndrome.     UNATTENDED SLEEP STUDY RECORDING PARAMETERS:  The patient underwent a formal technically adequate unattended diagnostic sleep study coordinated with the [facility].  The study was performed in accordance with the AASM standard for Out of Center Sleep Testing.  The four-channel Type III HST measures the following parameters:  flow, respiratory effort, pulse, and oxygen saturation.     SCORING:  This study was scored in accordance with AASM  scoring rules and Medicare rule 1B.     FINDINGS:  The flow evaluation time began at 9:43 p.m. and ended at 5:41 a.m. with a duration of 7 hours and 56 minutes.  Overall apnea-hypopnea index was 41.9 events per hour.  Supine AHI was 42 events per hour.  SpO2 jon was 81% and oxygen saturation was less than 88% for 17 minutes.  Mild snoring was noted during sleep study recording.  Average heart rate was 65 beats per minute, and maximum heart rate 93 beats per minute.     IMPRESSION:  Overall apnea-hypopnea index of 41.9 events per hour is consistent with severe obstructive sleep apnea syndrome.  SpO2 jon was 81% and oxygen saturation was less than 88% for 17 minutes.     DIAGNOSIS:  Obstructive sleep apnea syndrome (G47.33).     PLAN:    1.       At the request of referring physician, we will confer with the patient regarding the results of the sleep study and make treatment recommendations.  2.       The patient is a candidate for treatment with positive airway pressure (PAP) advised as first-line therapy.  Alternatives include oral appliance therapy and evaluation of upper airway by ear, nose, and throat specialist.  3.       Continuous positive airway pressure (CPAP) titration sleep study should be completed.  4.       Advise the patient to avoid alcoholic beverages and medications that are respiratory depressants and, if drowsy, use caution when driving or operating machinery.     Dictated By Jayce Ritter MD  d:08/11/2024 22:36:26     [facility]       Accredited by the American Academy of Sleep Medicine (AASM)     PATIENT'S NAME:        CELSO CRAIG  REFERRING PHYSICIAN:   Diamond Klein M.D., D.A.B.S.M.  CONSULTING PHYSICIAN:  Diamond Klein M.D., D.A.B.S.M.  PATIENT ACCOUNT #:     [id number]         LOCATION:       Sleep Center  MEDICAL RECORD #:      [id number]        YOB: 1965  DATE OF STUDY:         10/12/2014     SLEEP STUDY REPORT     STUDY TYPE:  Diagnostic  polysomnogram.     ORDERING PHYSICIAN:  Huan Wolfe M.D.     CLINICAL INDICATION:  A 49-year-old male, height 6 feet, weight 210  pounds, BMI 29, neck circumference 16 inches, with history of daytime  sleepiness and fatigue, snoring, witnessed apnea, frequent nocturnal  awakenings, unrefreshing sleep.  Bedtime 10 p.m., rise time 5 a.m.  [place] Sleepiness Scale mildly elevated at 10.     MEDICAL HISTORY:  Asthma, sinus problems.     DIAGNOSTIC RECORDING PARAMETERS:  The patient underwent a formal  polysomnographic evaluation at the [facility]. The study was  acquired in compliance with the AASM Manual for the Scoring of Sleep  and Associated Events. The following parameters were monitored: EOG,  EEG, ECG, chin EMG, left and right tibialis EMG, snore microphone, an  oronasal thermal sensor, nasal pressure transducer, respiratory  inductance plethysmography, and oxygen saturation via continuous  pulse oximetry. In accordance with AASM recommendations, hypopnea  events are scored based on an oxygen saturation more than or equal to  4 percent.  Body position is documented via technician notes every 15  minutes.     RESPIRATORY FINDINGS:  Apnea-hypopnea index 23 events per hour with  associated oxygen jon of 84%.  Apneas and hypopneas totaled 140 (4  obstructive apneas, 2 central apneas, 134 hypopneas).  Central apneas  were postarousal in nature.  Apneas events and oxygen desaturations  were increased during REM, with a REM AHI of 48, non-REM AHI 13.  The  majority of study was spent in the supine position with only 3% of  recording spent in the lateral position, thus positional component to  sleep-disordered breathing could not be fully assessed, but during  limited lateral sleep, the patient had a lateral AHI of 5 events per  hour.  Moderate to loud primary snoring was also seen intermittently  with frequent snore-related arousals.     OXYGENATION:  The patient spent 1 minute or less than 1% of  sleep  time with oxygen below 88%, reaching a jon of 84%.  Average oxygen  94%.     SLEEP ARCHITECTURE:  Total sleep time 363 minutes.  Sleep efficiency  85%.  Sleep latency 11 minutes.  REM latency 181 minutes.  Lights off  10:08 p.m. Lights on 5:15 a.m. Wake after sleep onset 50 minutes.     SLEEP STAGING:  Stage 1, 9%; stage 2, 62%; stage 3-4 sleep 0%.  REM  elevated at 29%.     BODY POSITION:  Supine sleep comprised 97% of sleep time.     EKG:  EKG was sinus rhythm with average heart rate of 62 beats per  minute.  No cardiac abnormalities were seen.     EMG:  Periodic limb movements were not observed.  REM EMG was normal.        ENCEPHALOGRAM:  EEG was normal.     DIAGNOSIS:  327.23 Obstructive sleep apnea.     IMPRESSION:  1.    Moderate obstructive sleep apnea with an AHI of 23 events per  hour and associated oxygen jon of 84%.  2.    Apnea events were increased during REM sleep with a REM AHI of  48 events per hour.  3.    Moderate to loud primary snoring was seen.  4.    The majority of study was spent in the supine position.  During  very limited lateral sleep, the patient did have reduced apnea  events.     RECOMMENDATIONS:  1.    Treatment options for sleep apnea could include positive airway  pressure treatment, CPAP, oromandibular device, or ENT  evaluation/surgical intervention.  2.    Reduction of upper airway resistance through evaluation and  treatment of nasal or hypopharyngeal sites of constriction or  obstruction including allergic rhinitis, weight loss, and avoidance  of the supine position may also be beneficial.  3.    The patient should avoid respiratory depressants including  alcohol, particularly within 3 to 4 hours of bedtime.     Thank you for support of [facility].  If formal sleep  consultation desired, feel free to contact us at [phone].     Dictated By Diamond Klein M.D., D.A.B.S.M.  d:    10/16/2014 09:37:55    This is a 59 year old male who presents with the  following symptoms, risk factors, behaviors or other items associated with sleep problems.     Sleep Apnea:   snoring; reflux during sleep; gasping/choking; stops breathing; mouth breathing; non-refreshing sleep; overweight; previous sleep study  Insomnia:  waking too early; non-refreshing sleep; mind racing; job stress  Restless Leg:  no symptoms or restless legs  Parasomnias:   no symptoms of parasomnias  Daytime Problems:  sleepiness; fatigue; irritable/tavarez; dificulty concentrating; inattentiveness; memory problems; previous sleep study             Study 9/26/2024 Type: CPAP Titration   Procedure: The CPAP Titration was performed with a sleep technologist in attendance at the [facility]. The following parameters were monitored: central, occipital and temporal EEG, EOG, submentalis EMG, nasal flow, nasal pressure, respiratory inductance plethysmography, snore microphone, oxygen saturation via continuous pulse oximetry, with an additional channel for measurement of CPAP flow for the titration of positive airway pressure. Sleep stages, periodic limb movements and EEG arousals were scored in accordance with the American Academy of Sleep Medicine Manual for the Scoring of Sleep and Associated Events. Apnea Hypopnea Index was calculated using the recommended definition of hypopnea for scoring respiratory events. Data acquisition, collection and scoring have been validated and clinically correlated.   Sleep History: CELSO CRAIG is a 59 year old Male referred by Dr. Jayce Ritter for a CPAP titration study. A previous sleep study was performed on 8/8/2024, revealed obstructive sleep apnea with an overall AHI of 41.9, supine AHI of 42.   Past Medical History is pertinent for allergic rhinitis, asthma, esophageal reflux, fatigue, heartburn.   At the time of the study, the patient was prescribed the following medications: albuterol, famotidine, ibuprofen.   The patient was studied from 09:47:35 PM to  05:29:13 AM, with a total recording time of 461.6, total sleep time of 387.5 and sleep efficiency of 83.9%. Wake after sleep onset was 53.0 minutes. The percentage of total sleep time by sleep stage is as follows: Stage 1 17.9%, Stage 2 60.4%, Stage 3 9.7% and Stage REM 12.0%.   Respiratory Analysis: Monitoring revealed resolution of respiratory events with CPAP at 7 cm H2O. Supine REM was observed at the final pressure. The Apnea/Hypopnea Index (AHI) was 8.1 at the final setting. The central sleep apnea index was 0. The oxygen jon was 89.7% and the patient spent 0% of sleep time spent with oxygen levels below 88%.   Snoring Profile: Snoring was resolved at the final pressure setting.   Cardiac Profile: Electrocardiogram demonstrated normal sinus rhythm.   EEG Profile: There was no evidence of epiliptiform activity during sleep. There were 50 spontaneous arousals, with a total arousal index of 13.9.   Periodic Limb Movements: PLM index of 2.9. PLM Arousal Index of 0.8.   IMPRESSIONS: This study demonstrates successful CPAP titration.   Diagnosis: Obstructive Sleep Apnea G47.33   RECOMMENDATIONS:   1. The patient should be prescribed CPAP at 7 cm H2O, with humidity at 3 and EPR off.   2. The patient was fitted with a ResMed AirFit P10 mask, size Large.   3. The patient should avoid alcohol and sedative medications, as these may worsen severity of symptoms.   4. The patient should avoid drowsy driving.   5. Patient may follow up with a sleep specialist in clinic.     Thank you for allowing me to participate in this patient's care. Please do not hesitate to contact me with any additional questions.   Dictated by: Dr. Ritter   Electronically signed by Jayce Ritter MD   (Electronic Signature)     Board Certified in Sleep Medicine   [place] Sleepiness Scale: (ESS) score on today's visit is 6  out of 24.     Score total of 1-6    Normal sleep   Score total of 7-8    Average sleepiness   Score total of 9-24    Abnormal  (possibly pathologic) sleepiness       Impression:    Obstructive sleep apnea syndrome (OSAS): Home sleep study performed on 8/8/2024  showed  Overall apnea-hypopnea index of 41.9 events per hour is consistent with severe obstructive sleep apnea syndrome.  SpO2 jon was 81% and oxygen saturation was less than 88% for 17 minutes. Treated with CAPp at 6 cwp; Download data on 6/2/2025  for 90 days shows adequate AHI and compliance   Daytime hypersomnolence/fatigue: improving   Class I obesity  ;  Body mass index is 30.24 kg/m².  Allergic rhinitis: hx allergy to cats and dogs  : controlled   Asthma, intermittent    GERD  Mildly elevated Cr                                Plan:    Continue current CPAP 7 cwp  as patient is using and benefiting from CPAP use.   Advised patient to change filters,masks,hoses  and tubes and equiptment on a  regular schedule.  Filters and seals shall be changed every 1 month,  Hoses every 3 months. CPAP mask and humidifier chamber changed every 6 month  with the durable medical equipment provider.   Mask Fit in office today   Advised about weight loss   Advised against drowsy driving and to avoid alcoholic beverage and respiratory depressants as these may worsen sleep apnea      Follow up:  6 months with PA and 1 year with Dr. Ritter     Thank you for allowing me to participate in your patient care.    WALLY Ritter MD, FACP, FCCP, FAA - Pulmonary/Critical care/Sleep Medicine  Please contact our office if you have any questions or concerns at [phone]    Note to the patient: The 21st Century Cures Act makes medical notes like these available to patients in the interest of transparency. However, be advised that this is a medical document. It is intended as peer to peer communication. It is written in medical language and may contain abbreviations or verbiage that are unfamiliar. It may appear blunt or direct. Medical documents are intended to carry relevant information, facts as evident,  and clinical opinion of the practitioner.      Disclaimer: Components of this note were documented using voice recognition system and are subject to errors not corrected at proofreading. Contact the author of this note for any clarifications

## 2025-06-04 NOTE — PATIENT INSTRUCTIONS
Plan:    Continue current CPAP 7 cwp  as patient is using and benefiting from CPAP use.   Advised patient to change filters,masks,hoses  and tubes and equiptment on a  regular schedule.  Filters and seals shall be changed every 1 month,  Hoses every 3 months. CPAP mask and humidifier chamber changed every 6 month  with the durable medical equipment provider.   Mask Fit in office today   Advised about weight loss   Advised against drowsy driving and to avoid alcoholic beverage and respiratory depressants as these may worsen sleep apnea      Follow up:  6 months with PA and 1 year with Dr. Stone Ritter MD      Obstructive Sleep Apnea  Obstructive sleep apnea is a condition caused by air passages becoming narrowed or blocked during sleep. As a result, breathing stops for short periods. Your body wakes up enough for breathing to start again. But you don't remember it. The cycle of stopped breathing and brief awakenings can repeat dozens of times a night. This prevents the body from getting to the deeper stages of sleep that are needed for good rest.   Signs of sleep apnea include loud snoring, noisy breathing, and gasping sounds during sleep. People with sleep apnea often find they use the bathroom many times during the night. Daytime symptoms include waking up tired after a full night's sleep and waking up with headaches. They can also include feeling very sleepy or falling asleep during the day, and having problems with memory or concentration.   Risk factors for sleep apnea include:  Being overweight  Being assigned male at birth, or being in menopause  Smoking  Using alcohol or sedating medicines  Having enlarged structures in the nose or throat such as enlarged tonsils or adenoids, or extra tissue in the airway  Home care  Lifestyle changes that can help treat snoring and sleep apnea include:   If you're overweight, talk with your healthcare provider about a weight-loss plan  for you.  Don't drink alcohol for 3 to 4 hours before bedtime.  Don't take sedating medicines. Ask your healthcare provider about the medicines you take.  If you smoke, talk to your provider about ways to quit. It's important to stay away from secondhand smoke. Don't use e-cigarettes because of their harmful side effects.  Sleep on your side. This can help prevent gravity from pulling relaxed throat tissues into your breathing passages.  If you have allergies or sinus problems that block your nose, ask your provider for help.  Use positive airway pressure (PAP). Discuss with your provider the benefits of using PAP at home. And talk about the type of PAP that's best for you.  Follow-up care  Follow up with your healthcare provider, or as advised. A diagnosis of sleep apnea is made with a sleep study. Your provider can tell you more about this test.   When to get medical care  See your healthcare provider if you have daytime symptoms of sleep apnea. These include:   Waking up tired after a full night's sleep  Waking up with a headache  Feeling very sleepy or falling asleep during the day  Having problems with memory or concentration  Also talk with your provider if your partner tells you that you snore, gasp for air, or stop breathing while you sleep.   Seeing your provider is important because sleep apnea can make you more likely to have certain health problems. These include high blood pressure, heart attack, stroke, and sexual dysfunction. If you have sleep apnea, talk with your healthcare provider about the best treatments for you.   [employer] last reviewed this educational content on 5/1/2022 © 2000-2023 [employer]. All rights reserved. This information is not intended as a substitute for professional medical care. Always follow your healthcare professional's instructions.        Continuous Positive Air Pressure (CPAP)     A mask over the nose gently directs air into the throat to keep the airway  open.     Continuous positive air pressure (CPAP) uses gentle air pressure to hold the airway open. CPAP is often the most effective treatment for sleep apnea. It works very well as a treatment for adults diagnosed with obstructive sleep apnea with a lot of sleepiness. But keep in mind that it can take several adjustments before the setup is right for you.   How CPAP works  The CPAP machine  is a small portable pump that sits beside the bed. The pump sends air through a hose, which is held over your nose alone, or nose and mouth by a mask. Mild air pressure is gently pushed through your airway. The air pressure nudges sagging tissues aside. This widens the airway so you can breathe better. CPAP may be combined with other kinds of therapy for sleep apnea.   Types of air pressure treatments  There are different types of CPAP. Your doctor or CPAP technician will help you decide which type is best for you:   Basic CPAP keeps the pressure constant all night long.  A bilevel device (BiPAP) provides more pressure when you breathe in and less when you breathe out. A BiPAP machine also may be set to provide automatic breaths to maintain breathing if you stop breathing while sleeping.  An autoCPAP device automatically adjusts pressure throughout the night and in response to changes such as body position, sleep stage, and snoring.  [employer] last reviewed this educational content on 7/1/2019  © 4371-4884 [employer]. All rights reserved. This information is not intended as a substitute for professional medical care. Always follow your healthcare professional's instructions.

## 2025-08-07 ENCOUNTER — OFFICE VISIT (OUTPATIENT)
Dept: INTERNAL MEDICINE CLINIC | Facility: CLINIC | Age: 60
End: 2025-08-07

## 2025-08-07 VITALS
DIASTOLIC BLOOD PRESSURE: 64 MMHG | OXYGEN SATURATION: 96 % | BODY MASS INDEX: 30.31 KG/M2 | SYSTOLIC BLOOD PRESSURE: 112 MMHG | RESPIRATION RATE: 18 BRPM | HEIGHT: 72 IN | TEMPERATURE: 98 F | WEIGHT: 223.81 LBS | HEART RATE: 70 BPM

## 2025-08-07 DIAGNOSIS — Z13.29 SCREENING FOR THYROID DISORDER: ICD-10-CM

## 2025-08-07 DIAGNOSIS — G47.33 OSA (OBSTRUCTIVE SLEEP APNEA): ICD-10-CM

## 2025-08-07 DIAGNOSIS — E78.5 DYSLIPIDEMIA: ICD-10-CM

## 2025-08-07 DIAGNOSIS — Z13.0 SCREENING FOR BLOOD DISEASE: ICD-10-CM

## 2025-08-07 DIAGNOSIS — Z13.1 SCREENING FOR DIABETES MELLITUS: ICD-10-CM

## 2025-08-07 DIAGNOSIS — Z12.5 SCREENING FOR PROSTATE CANCER: ICD-10-CM

## 2025-08-07 DIAGNOSIS — J01.90 ACUTE NON-RECURRENT SINUSITIS, UNSPECIFIED LOCATION: Primary | ICD-10-CM

## 2025-08-07 PROCEDURE — 3008F BODY MASS INDEX DOCD: CPT | Performed by: NURSE PRACTITIONER

## 2025-08-07 PROCEDURE — 3078F DIAST BP <80 MM HG: CPT | Performed by: NURSE PRACTITIONER

## 2025-08-07 PROCEDURE — 3074F SYST BP LT 130 MM HG: CPT | Performed by: NURSE PRACTITIONER

## 2025-08-07 PROCEDURE — G2211 COMPLEX E/M VISIT ADD ON: HCPCS | Performed by: NURSE PRACTITIONER

## 2025-08-07 PROCEDURE — 99214 OFFICE O/P EST MOD 30 MIN: CPT | Performed by: NURSE PRACTITIONER

## 2025-08-07 RX ORDER — CETIRIZINE HYDROCHLORIDE 10 MG/1
CAPSULE, LIQUID FILLED ORAL
COMMUNITY
Start: 2025-07-01

## 2025-08-07 RX ORDER — PREDNISONE 10 MG/1
10 TABLET ORAL DAILY
Qty: 10 TABLET | Refills: 0 | Status: SHIPPED | OUTPATIENT
Start: 2025-08-07 | End: 2025-08-17

## 2025-08-07 RX ORDER — FLUTICASONE PROPIONATE 50 MCG
1 SPRAY, SUSPENSION (ML) NASAL 2 TIMES DAILY
Qty: 3 EACH | Refills: 2 | Status: SHIPPED | OUTPATIENT
Start: 2025-08-07

## 2025-08-29 ENCOUNTER — LAB ENCOUNTER (OUTPATIENT)
Dept: LAB | Age: 60
End: 2025-08-29
Attending: NURSE PRACTITIONER

## 2025-08-29 DIAGNOSIS — Z12.5 SCREENING FOR PROSTATE CANCER: ICD-10-CM

## 2025-08-29 DIAGNOSIS — Z13.29 SCREENING FOR THYROID DISORDER: ICD-10-CM

## 2025-08-29 DIAGNOSIS — E78.5 DYSLIPIDEMIA: ICD-10-CM

## 2025-08-29 DIAGNOSIS — Z13.1 SCREENING FOR DIABETES MELLITUS: ICD-10-CM

## 2025-08-29 DIAGNOSIS — Z13.0 SCREENING FOR BLOOD DISEASE: ICD-10-CM

## 2025-08-29 LAB
ALBUMIN SERPL-MCNC: 4.6 G/DL (ref 3.2–4.8)
ALBUMIN/GLOB SERPL: 1.8 (ref 1–2)
ALP LIVER SERPL-CCNC: 90 U/L (ref 45–117)
ALT SERPL-CCNC: 19 U/L (ref 10–49)
ANION GAP SERPL CALC-SCNC: 15 MMOL/L (ref 0–18)
AST SERPL-CCNC: 19 U/L (ref ?–34)
BASOPHILS # BLD AUTO: 0.01 X10(3) UL (ref 0–0.2)
BASOPHILS NFR BLD AUTO: 0.2 %
BILIRUB SERPL-MCNC: 0.8 MG/DL (ref 0.3–1.2)
BUN BLD-MCNC: 21 MG/DL (ref 9–23)
CALCIUM BLD-MCNC: 9.5 MG/DL (ref 8.7–10.6)
CHLORIDE SERPL-SCNC: 104 MMOL/L (ref 98–112)
CHOLEST SERPL-MCNC: 181 MG/DL (ref ?–200)
CO2 SERPL-SCNC: 24 MMOL/L (ref 21–32)
COMPLEXED PSA SERPL-MCNC: 1.24 NG/ML (ref ?–4)
CREAT BLD-MCNC: 1.36 MG/DL (ref 0.7–1.3)
EGFRCR SERPLBLD CKD-EPI 2021: 60 ML/MIN/1.73M2 (ref 60–?)
EOSINOPHIL # BLD AUTO: 0.07 X10(3) UL (ref 0–0.7)
EOSINOPHIL NFR BLD AUTO: 1.3 %
ERYTHROCYTE [DISTWIDTH] IN BLOOD BY AUTOMATED COUNT: 13.6 %
FASTING PATIENT LIPID ANSWER: NO
FASTING STATUS PATIENT QL REPORTED: NO
GLOBULIN PLAS-MCNC: 2.6 G/DL (ref 2–3.5)
GLUCOSE BLD-MCNC: 107 MG/DL (ref 70–99)
HCT VFR BLD AUTO: 45.9 % (ref 39–53)
HDLC SERPL-MCNC: 45 MG/DL (ref 40–59)
HGB BLD-MCNC: 15 G/DL (ref 13–17.5)
IMM GRANULOCYTES # BLD AUTO: 0.01 X10(3) UL (ref 0–1)
IMM GRANULOCYTES NFR BLD: 0.2 %
LDLC SERPL CALC-MCNC: 126 MG/DL (ref ?–100)
LYMPHOCYTES # BLD AUTO: 1.33 X10(3) UL (ref 1–4)
LYMPHOCYTES NFR BLD AUTO: 24.4 %
MCH RBC QN AUTO: 30.6 PG (ref 26–34)
MCHC RBC AUTO-ENTMCNC: 32.7 G/DL (ref 31–37)
MCV RBC AUTO: 93.7 FL (ref 80–100)
MONOCYTES # BLD AUTO: 0.59 X10(3) UL (ref 0.1–1)
MONOCYTES NFR BLD AUTO: 10.8 %
NEUTROPHILS # BLD AUTO: 3.45 X10 (3) UL (ref 1.5–7.7)
NEUTROPHILS # BLD AUTO: 3.45 X10(3) UL (ref 1.5–7.7)
NEUTROPHILS NFR BLD AUTO: 63.1 %
NONHDLC SERPL-MCNC: 136 MG/DL (ref ?–130)
OSMOLALITY SERPL CALC.SUM OF ELEC: 299 MOSM/KG (ref 275–295)
PLATELET # BLD AUTO: 293 10(3)UL (ref 150–450)
POTASSIUM SERPL-SCNC: 4.4 MMOL/L (ref 3.5–5.1)
PROT SERPL-MCNC: 7.2 G/DL (ref 5.7–8.2)
RBC # BLD AUTO: 4.9 X10(6)UL (ref 4.3–5.7)
SODIUM SERPL-SCNC: 143 MMOL/L (ref 136–145)
TRIGL SERPL-MCNC: 53 MG/DL (ref 30–149)
TSI SER-ACNC: 3.25 UIU/ML (ref 0.55–4.78)
VLDLC SERPL CALC-MCNC: 9 MG/DL (ref 0–30)
WBC # BLD AUTO: 5.5 X10(3) UL (ref 4–11)

## 2025-08-29 PROCEDURE — 80050 GENERAL HEALTH PANEL: CPT | Performed by: NURSE PRACTITIONER

## 2025-08-29 PROCEDURE — 80061 LIPID PANEL: CPT | Performed by: NURSE PRACTITIONER

## 2025-08-29 PROCEDURE — 84153 ASSAY OF PSA TOTAL: CPT | Performed by: NURSE PRACTITIONER

## (undated) NOTE — MR AVS SNAPSHOT
[address] [facility]  [place] [id number] [phone]               Thank you for choosing us for your health care visit with CARLOS Bacon.   We are glad to serve you and happy to provide you wi · Heat may help soothe painful areas of the face. Use a towel soaked in hot water. Or,  the shower and direct the hot spray onto your face.  Using a vaporizer along with a menthol rub at night may also help.   · An expectorant containing guaifenesin · Vision problems, including blurred or double vision  · Fever of 100.4ºF (38ºC) or higher, or as directed by your healthcare provider  · Seizure  · Breathing problems  · Symptoms not resolving within 10 days  Date Last Reviewed: 4/13/2015  © 4719-2496 The Your healthcare provider will ask you about your symptoms. The exam  Your provider listens Blossom Choe chest for signs of congestion. He or she may also check your ears, nose, and throat. Possible tests  · A sputum test for bacteria.  This requires a sample o You should see your provider again in 2 to 3 weeks. By this time, symptoms should have improved. An infection that lasts longer may mean you have a more serious problem. Prevention  · Avoid tobacco smoke. If you smoke, quit. Stay away from smoky places.  A Where to Get Your Medications      These medications were sent to [facility] [address], [address] [facility], [phone], [phone]  [facility], [address] [facility]     Phone:  [phone] - a increments are effective and add up over the week   2 ½ hours per week – spread out over time Use a jean-pierre to keep you motivated   Don’t forget strength training with weights and resistance Set goals and track your progress   You don’t need to join a gym.

## (undated) NOTE — LETTER
10/09/24    Patient: Renzo Silverio  : 1965 Visit date: 10/9/2024    Dear  JACKIE Almonte    Thank you for referring Renzo Silverio to my practice.  Please find my assessment and plan below.     Assessment   1. Diastasis recti          Plan     The patient has diastases recti in the epigastric region with no hernia defect.    No general surgical intervention warranted.    I explained to the patient that this does not represent a hernia and that there is no danger with physical activity causing obstruction or strangulation.    Should the patient desire repair or correction, he is to make an appointment with plastic surgeon for further recommendations.  I offered names of plastic surgeons if the patient wishes to consider this option.    The patient will return to my attention on as-needed basis.    The patient was provided ample opportunity to ask questions.  All of the patient's questions were answered in detail.  The patient voiced understanding of the care plan.      Sincerely,       Chuck Mcbride MD   CC: No Recipients

## (undated) NOTE — LETTER
Patient: Ernesto Ashford  : 1965 Visit date: 2020    Dear  Dr. Artur Partida MD,    Thank you for referring Ernesto Ashford to my practice. Please find my assessment and plan below.              History of Present Illness     61-year-old gentleman w